# Patient Record
Sex: MALE | Race: WHITE | NOT HISPANIC OR LATINO | Employment: UNEMPLOYED | URBAN - METROPOLITAN AREA
[De-identification: names, ages, dates, MRNs, and addresses within clinical notes are randomized per-mention and may not be internally consistent; named-entity substitution may affect disease eponyms.]

---

## 2019-07-20 ENCOUNTER — OFFICE VISIT (OUTPATIENT)
Dept: URGENT CARE | Facility: CLINIC | Age: 69
End: 2019-07-20
Payer: MEDICARE

## 2019-07-20 VITALS
HEIGHT: 67 IN | RESPIRATION RATE: 18 BRPM | OXYGEN SATURATION: 95 % | SYSTOLIC BLOOD PRESSURE: 155 MMHG | BODY MASS INDEX: 29.82 KG/M2 | HEART RATE: 77 BPM | WEIGHT: 190 LBS | DIASTOLIC BLOOD PRESSURE: 89 MMHG | TEMPERATURE: 99 F

## 2019-07-20 DIAGNOSIS — B02.9 HERPES ZOSTER WITHOUT COMPLICATION: Primary | ICD-10-CM

## 2019-07-20 PROCEDURE — 99214 OFFICE O/P EST MOD 30 MIN: CPT | Mod: S$GLB,,, | Performed by: INTERNAL MEDICINE

## 2019-07-20 PROCEDURE — 99214 PR OFFICE/OUTPT VISIT, EST, LEVL IV, 30-39 MIN: ICD-10-PCS | Mod: S$GLB,,, | Performed by: INTERNAL MEDICINE

## 2019-07-20 RX ORDER — ZOLPIDEM TARTRATE 10 MG/1
TABLET ORAL
Refills: 1 | COMMUNITY
Start: 2019-07-16 | End: 2021-02-05 | Stop reason: SDUPTHER

## 2019-07-20 RX ORDER — CAPSAICIN 0.75 MG/G
CREAM TOPICAL 3 TIMES DAILY
Refills: 0 | COMMUNITY
Start: 2019-07-20 | End: 2021-02-05

## 2019-07-20 RX ORDER — BUPROPION HYDROCHLORIDE 150 MG/1
150 TABLET, EXTENDED RELEASE ORAL 2 TIMES DAILY
Refills: 3 | COMMUNITY
Start: 2019-07-16 | End: 2021-08-11 | Stop reason: SDUPTHER

## 2019-07-20 RX ORDER — LISINOPRIL 40 MG/1
40 TABLET ORAL DAILY
Refills: 3 | COMMUNITY
Start: 2019-07-16 | End: 2021-04-19 | Stop reason: SDUPTHER

## 2019-07-20 RX ORDER — DILTIAZEM HYDROCHLORIDE 240 MG/1
1 CAPSULE, EXTENDED RELEASE ORAL DAILY
Refills: 3 | COMMUNITY
Start: 2019-07-16 | End: 2021-04-19 | Stop reason: SDUPTHER

## 2019-07-20 RX ORDER — FAMCICLOVIR 500 MG/1
500 TABLET ORAL 3 TIMES DAILY
Qty: 21 TABLET | Refills: 0 | Status: SHIPPED | OUTPATIENT
Start: 2019-07-20 | End: 2019-07-27

## 2019-07-20 RX ORDER — METFORMIN HYDROCHLORIDE 500 MG/1
500 TABLET ORAL 3 TIMES DAILY
Refills: 1 | COMMUNITY
Start: 2019-05-22 | End: 2021-02-05 | Stop reason: SDUPTHER

## 2019-07-20 RX ORDER — HYDROCHLOROTHIAZIDE 25 MG/1
25 TABLET ORAL DAILY
Refills: 3 | COMMUNITY
Start: 2019-07-16 | End: 2021-04-19 | Stop reason: SDUPTHER

## 2019-07-20 RX ORDER — ATORVASTATIN CALCIUM 80 MG/1
80 TABLET, FILM COATED ORAL DAILY
Refills: 3 | COMMUNITY
Start: 2019-07-16 | End: 2021-02-05 | Stop reason: SDUPTHER

## 2019-07-20 NOTE — PATIENT INSTRUCTIONS

## 2019-07-20 NOTE — PROGRESS NOTES
"Subjective:       Patient ID: Miki Faustin is a 68 y.o. male.    Vitals:  height is 5' 7" (1.702 m) and weight is 86.2 kg (190 lb). His oral temperature is 98.7 °F (37.1 °C). His blood pressure is 155/89 (abnormal) and his pulse is 77. His respiration is 18 and oxygen saturation is 95%.     Chief Complaint: Rash    Pt presents today with rash (shingle) X's 3 days, pt states he did have his Shingles vaccine, pt also states he picked up a gate possibly pulled muscle in his chest,     Rash   This is a new problem. The current episode started in the past 7 days. The problem is unchanged. The affected locations include the abdomen and back. The rash is characterized by blistering and redness. He was exposed to nothing. Past treatments include topical steroids. The treatment provided no relief.       Skin: Positive for rash and erythema (Linear rash noted on the left chest.  It is not crossing midline. Erythematous base with some blisters noted).        Edema,erythema       Objective:      Physical Exam   Constitutional: He is oriented to person, place, and time. He appears well-developed and well-nourished. He is cooperative.  Non-toxic appearance. He does not appear ill. No distress.   HENT:   Head: Normocephalic and atraumatic.   Right Ear: Hearing, tympanic membrane, external ear and ear canal normal.   Left Ear: Hearing, tympanic membrane, external ear and ear canal normal.   Nose: Nose normal. No mucosal edema, rhinorrhea or nasal deformity. No epistaxis. Right sinus exhibits no maxillary sinus tenderness and no frontal sinus tenderness. Left sinus exhibits no maxillary sinus tenderness and no frontal sinus tenderness.   Mouth/Throat: Uvula is midline, oropharynx is clear and moist and mucous membranes are normal. No trismus in the jaw. Normal dentition. No uvula swelling. No posterior oropharyngeal erythema.   Eyes: Conjunctivae and lids are normal. Right eye exhibits no discharge. Left eye exhibits no discharge. " No scleral icterus.   Sclera clear bilat   Neck: Trachea normal, normal range of motion, full passive range of motion without pain and phonation normal. Neck supple.   Cardiovascular: Normal rate, regular rhythm, normal heart sounds, intact distal pulses and normal pulses.   Pulmonary/Chest: Effort normal and breath sounds normal. No respiratory distress.   Abdominal: Soft. Normal appearance and bowel sounds are normal. He exhibits no distension, no pulsatile midline mass and no mass. There is no tenderness.   Musculoskeletal: Normal range of motion. He exhibits no edema or deformity.   Neurological: He is alert and oriented to person, place, and time. He exhibits normal muscle tone. Coordination normal.   Skin: Skin is warm, dry and intact. He is not diaphoretic. There is erythema (Linear rash noted on the left chest.  It is not crossing midline. Erythematous base with some blisters noted). No pallor.        Psychiatric: He has a normal mood and affect. His speech is normal and behavior is normal. Judgment and thought content normal. Cognition and memory are normal.   Nursing note and vitals reviewed.      Assessment:       1. Herpes zoster without complication        Plan:         Herpes zoster without complication  -     famciclovir (FAMVIR) 500 MG tablet; Take 1 tablet (500 mg total) by mouth 3 (three) times daily. for 7 days  Dispense: 21 tablet; Refill: 0  -     capsicum 0.075% (ZOSTRIX) 0.075 % topical cream; Apply topically 3 (three) times daily.; Refill: 0      Patient Instructions       Shingles (Herpes Zoster)     Talk to your healthcare provider about the shingles vaccine.     Shingles is also called herpes zoster. It is a painful skin rash caused by the herpes zoster virus. This is the same virus that causes chickenpox. After a person has chickenpox, the virus remains inactive in the nerve cells. Years later, the virus can become active again and travel to the skin. Most people have shingles only once,  but it is possible to have it more than once.  What are the risk factors for shingles?  Anyone who has ever had chickenpox can develop shingles. But your risk is greater if you:  · Are 50 years of age or older  · Have an illness that weakens your immune system, such as HIV/AIDS  · Have cancer, especially Hodgkin disease or lymphoma  · Take medicines that weaken your immune system  What are the symptoms of shingles?  · The first sign of shingles is usually pain, burning, tingling, or itching on one part of your face or body. You may also feel as if you have the flu, with fever and chills.  · A red rash with small blisters appears within a few days. The rash may appear as follows:   ¨ The blisters can occur anywhere, but theyre most common on the back, chest, or abdomen.  ¨ They usually appear on only one side of the body, spreading along the nerve pathway where the virus was inactive.   ¨ The rash can also form around an eye, along one side of the face or neck, or in the mouth.  ¨ In a few people, usually those with weakened immune systems, shingles appear on more than one part of the body at once.  · After a few days, the blisters become dry and form a crust. The crust falls off in days to weeks. The blisters generally do not leave scars.  How is shingles treated?  For most people, shingles heals on its own in a few weeks. But treatment is recommended to help relieve pain, speed healing, and reduce the risk of complications. Antiviral medicines are prescribed within the first 72 hours of the appearance of the rash. To lessen symptoms:  · Apply ice packs (wrapped in a thin towel) or cool compresses, or soak in a cool bath.  · Use calamine lotion to calm itchy skin.  · Ask your healthcare provider about over-the-counter pain relievers. If your pain is severe, your healthcare provider may prescribe stronger pain medicines.  What are the complications of shingles?  Shingles often goes away with no lasting effects. But  some people have serious problems long after the blisters have healed:  · Postherpetic neuralgia. This is the most common complication. It is severe nerve pain at the place where the rash used to be. It can last for months, or even years after you have had shingles. Medicines can be prescribed to help relieve the pain and improve quality of life.  · Bacterial infection. Shingles blisters may become infected with bacteria. Antibiotic medicine is used to treat the infection.  · Eye problems. A person with shingles on the face should see his or her healthcare provider right away. Shingles can cause serious problems with vision, and even blindness.  Very rarely shingles can also lead to pneumonia, hearing problems, brain inflammation, or even death.   When to seek medical care  Contact your healthcare provider if you experience any of the following:  · Symptoms that dont go away with treatment  · A rash or blisters near your eye  · Increased drainage, fever, or rash after treatment, or severe pain that doesnt go away   How can shingles be prevented?  You can only get shingles if you have had chicken pox in the past. Those who have never had chickenpox can get the virus from you. Although instead of developing shingles, the person may get chickenpox. Until your blisters form scabs, avoid contact with others, especially the following:  · Pregnant women who have never had chickenpox or the vaccine  · Infants who were born early (prematurely) or who had low weight at birth  · People with weak immune system (for example, people receiving chemotherapy for cancer, people who have had organ transplants, or people with HIV infections)     The shingles vaccine  If youre 60 years of age or older, ask your healthcare provider if you should receive the shingles vaccine. The vaccine makes it less likely that you will develop shingles. If you do develop shingles, your symptoms will likely be milder than if you hadnt been  vaccinated. Note: Although the vaccine is licensed for people 50 years of age or older, the CDC does not recommend the vaccine for those who are 50 to 59 years old.   Date Last Reviewed: 10/1/2016  © 9514-9037 [employer]. [address] [place] [address], [place] [address]. All rights reserved. This information is not intended as a substitute for professional medical care. Always follow your healthcare professional's instructions.

## 2019-07-23 ENCOUNTER — TELEPHONE (OUTPATIENT)
Dept: URGENT CARE | Facility: CLINIC | Age: 69
End: 2019-07-23

## 2019-07-23 NOTE — TELEPHONE ENCOUNTER
Pt states that he is using the ointment and the pills and states that he is still uncomfortable but knows that it has to run its course.

## 2021-01-29 ENCOUNTER — TELEPHONE (OUTPATIENT)
Dept: FAMILY MEDICINE | Facility: CLINIC | Age: 71
End: 2021-01-29

## 2021-02-05 ENCOUNTER — OFFICE VISIT (OUTPATIENT)
Dept: FAMILY MEDICINE | Facility: CLINIC | Age: 71
End: 2021-02-05
Payer: MEDICARE

## 2021-02-05 VITALS
BODY MASS INDEX: 30.79 KG/M2 | DIASTOLIC BLOOD PRESSURE: 96 MMHG | WEIGHT: 191.56 LBS | SYSTOLIC BLOOD PRESSURE: 148 MMHG | HEART RATE: 88 BPM | HEIGHT: 66 IN

## 2021-02-05 DIAGNOSIS — R01.1 CARDIAC MURMUR: ICD-10-CM

## 2021-02-05 DIAGNOSIS — Z00.00 MEDICARE ANNUAL WELLNESS VISIT, SUBSEQUENT: ICD-10-CM

## 2021-02-05 DIAGNOSIS — G47.00 INSOMNIA, UNSPECIFIED TYPE: ICD-10-CM

## 2021-02-05 DIAGNOSIS — R79.89 ABNORMAL CBC: ICD-10-CM

## 2021-02-05 DIAGNOSIS — L73.2 HIDRADENITIS AXILLARIS: ICD-10-CM

## 2021-02-05 DIAGNOSIS — R73.02 GLUCOSE INTOLERANCE (IMPAIRED GLUCOSE TOLERANCE): ICD-10-CM

## 2021-02-05 DIAGNOSIS — I10 ESSENTIAL HYPERTENSION: ICD-10-CM

## 2021-02-05 DIAGNOSIS — E78.2 MIXED HYPERLIPIDEMIA: Primary | ICD-10-CM

## 2021-02-05 PROCEDURE — 99213 OFFICE O/P EST LOW 20 MIN: CPT | Mod: PBBFAC,PN | Performed by: INTERNAL MEDICINE

## 2021-02-05 PROCEDURE — 99999 PR PBB SHADOW E&M-EST. PATIENT-LVL III: CPT | Mod: PBBFAC,,, | Performed by: INTERNAL MEDICINE

## 2021-02-05 PROCEDURE — 99214 OFFICE O/P EST MOD 30 MIN: CPT | Mod: S$PBB,,, | Performed by: INTERNAL MEDICINE

## 2021-02-05 PROCEDURE — 99214 PR OFFICE/OUTPT VISIT, EST, LEVL IV, 30-39 MIN: ICD-10-PCS | Mod: S$PBB,,, | Performed by: INTERNAL MEDICINE

## 2021-02-05 PROCEDURE — 99999 PR PBB SHADOW E&M-EST. PATIENT-LVL III: ICD-10-PCS | Mod: PBBFAC,,, | Performed by: INTERNAL MEDICINE

## 2021-02-05 RX ORDER — PREDNISONE 10 MG/1
10 TABLET ORAL 2 TIMES DAILY
Qty: 10 TABLET | Refills: 0 | Status: SHIPPED | OUTPATIENT
Start: 2021-02-05 | End: 2021-02-10

## 2021-02-05 RX ORDER — ASPIRIN 81 MG/1
81 TABLET ORAL DAILY
COMMUNITY

## 2021-02-05 RX ORDER — METFORMIN HYDROCHLORIDE 500 MG/1
500 TABLET ORAL 3 TIMES DAILY
Qty: 270 TABLET | Refills: 2 | Status: SHIPPED | OUTPATIENT
Start: 2021-02-05 | End: 2022-01-10 | Stop reason: SDUPTHER

## 2021-02-05 RX ORDER — ZOLPIDEM TARTRATE 10 MG/1
10 TABLET ORAL NIGHTLY
Qty: 90 TABLET | Refills: 1 | Status: SHIPPED | OUTPATIENT
Start: 2021-02-05 | End: 2021-08-11 | Stop reason: SDUPTHER

## 2021-02-05 RX ORDER — ATORVASTATIN CALCIUM 80 MG/1
80 TABLET, FILM COATED ORAL DAILY
Qty: 90 TABLET | Refills: 2 | Status: SHIPPED | OUTPATIENT
Start: 2021-02-05 | End: 2022-02-15 | Stop reason: SDUPTHER

## 2021-02-09 ENCOUNTER — LAB VISIT (OUTPATIENT)
Dept: LAB | Facility: HOSPITAL | Age: 71
End: 2021-02-09
Attending: INTERNAL MEDICINE
Payer: MEDICARE

## 2021-02-09 DIAGNOSIS — E78.2 MIXED HYPERLIPIDEMIA: ICD-10-CM

## 2021-02-09 DIAGNOSIS — R79.89 ABNORMAL CBC: ICD-10-CM

## 2021-02-09 DIAGNOSIS — Z00.00 MEDICARE ANNUAL WELLNESS VISIT, SUBSEQUENT: ICD-10-CM

## 2021-02-09 DIAGNOSIS — R73.02 GLUCOSE INTOLERANCE (IMPAIRED GLUCOSE TOLERANCE): ICD-10-CM

## 2021-02-09 LAB
ALBUMIN SERPL BCP-MCNC: 4.4 G/DL (ref 3.5–5.2)
ALP SERPL-CCNC: 113 U/L (ref 55–135)
ALT SERPL W/O P-5'-P-CCNC: 25 U/L (ref 10–44)
ANION GAP SERPL CALC-SCNC: 8 MMOL/L (ref 8–16)
AST SERPL-CCNC: 22 U/L (ref 10–40)
BASOPHILS # BLD AUTO: 0.06 K/UL (ref 0–0.2)
BASOPHILS NFR BLD: 0.8 % (ref 0–1.9)
BILIRUB SERPL-MCNC: 0.5 MG/DL (ref 0.1–1)
BUN SERPL-MCNC: 23 MG/DL (ref 8–23)
CALCIUM SERPL-MCNC: 10 MG/DL (ref 8.7–10.5)
CHLORIDE SERPL-SCNC: 101 MMOL/L (ref 95–110)
CHOLEST SERPL-MCNC: 147 MG/DL (ref 120–199)
CHOLEST/HDLC SERPL: 3.8 {RATIO} (ref 2–5)
CO2 SERPL-SCNC: 29 MMOL/L (ref 23–29)
CREAT SERPL-MCNC: 1.1 MG/DL (ref 0.5–1.4)
DIFFERENTIAL METHOD: ABNORMAL
EOSINOPHIL # BLD AUTO: 0.1 K/UL (ref 0–0.5)
EOSINOPHIL NFR BLD: 1.6 % (ref 0–8)
ERYTHROCYTE [DISTWIDTH] IN BLOOD BY AUTOMATED COUNT: 13.1 % (ref 11.5–14.5)
EST. GFR  (AFRICAN AMERICAN): >60 ML/MIN/1.73 M^2
EST. GFR  (NON AFRICAN AMERICAN): >60 ML/MIN/1.73 M^2
ESTIMATED AVG GLUCOSE: 126 MG/DL (ref 68–131)
GLUCOSE SERPL-MCNC: 143 MG/DL (ref 70–110)
HBA1C MFR BLD: 6 % (ref 4–5.6)
HCT VFR BLD AUTO: 50.2 % (ref 40–54)
HDLC SERPL-MCNC: 39 MG/DL (ref 40–75)
HDLC SERPL: 26.5 % (ref 20–50)
HGB BLD-MCNC: 15.6 G/DL (ref 14–18)
IMM GRANULOCYTES # BLD AUTO: 0.02 K/UL (ref 0–0.04)
IMM GRANULOCYTES NFR BLD AUTO: 0.3 % (ref 0–0.5)
LDLC SERPL CALC-MCNC: 94 MG/DL (ref 63–159)
LYMPHOCYTES # BLD AUTO: 0.8 K/UL (ref 1–4.8)
LYMPHOCYTES NFR BLD: 9.5 % (ref 18–48)
MCH RBC QN AUTO: 28 PG (ref 27–31)
MCHC RBC AUTO-ENTMCNC: 31.1 G/DL (ref 32–36)
MCV RBC AUTO: 90 FL (ref 82–98)
MONOCYTES # BLD AUTO: 0.3 K/UL (ref 0.3–1)
MONOCYTES NFR BLD: 3.8 % (ref 4–15)
NEUTROPHILS # BLD AUTO: 6.7 K/UL (ref 1.8–7.7)
NEUTROPHILS NFR BLD: 84 % (ref 38–73)
NONHDLC SERPL-MCNC: 108 MG/DL
NRBC BLD-RTO: 0 /100 WBC
PLATELET # BLD AUTO: 352 K/UL (ref 150–350)
PMV BLD AUTO: 10 FL (ref 9.2–12.9)
POTASSIUM SERPL-SCNC: 4.7 MMOL/L (ref 3.5–5.1)
PROT SERPL-MCNC: 7.7 G/DL (ref 6–8.4)
RBC # BLD AUTO: 5.58 M/UL (ref 4.6–6.2)
SODIUM SERPL-SCNC: 138 MMOL/L (ref 136–145)
TRIGL SERPL-MCNC: 70 MG/DL (ref 30–150)
WBC # BLD AUTO: 7.96 K/UL (ref 3.9–12.7)

## 2021-02-09 PROCEDURE — 36415 COLL VENOUS BLD VENIPUNCTURE: CPT | Mod: PN

## 2021-02-09 PROCEDURE — 80061 LIPID PANEL: CPT

## 2021-02-09 PROCEDURE — 85025 COMPLETE CBC W/AUTO DIFF WBC: CPT

## 2021-02-09 PROCEDURE — 80053 COMPREHEN METABOLIC PANEL: CPT

## 2021-02-09 PROCEDURE — 83036 HEMOGLOBIN GLYCOSYLATED A1C: CPT

## 2021-02-10 DIAGNOSIS — Z12.11 COLON CANCER SCREENING: ICD-10-CM

## 2021-02-18 ENCOUNTER — CLINICAL SUPPORT (OUTPATIENT)
Dept: CARDIOLOGY | Facility: CLINIC | Age: 71
End: 2021-02-18
Attending: INTERNAL MEDICINE
Payer: MEDICARE

## 2021-02-18 VITALS — BODY MASS INDEX: 30.7 KG/M2 | WEIGHT: 191 LBS | HEIGHT: 66 IN

## 2021-02-18 DIAGNOSIS — R01.1 CARDIAC MURMUR: ICD-10-CM

## 2021-02-18 LAB
ASCENDING AORTA: 3.04 CM
AV INDEX (PROSTH): 0.87
AV MEAN GRADIENT: 6 MMHG
AV PEAK GRADIENT: 8 MMHG
AV VALVE AREA: 2.93 CM2
AV VELOCITY RATIO: 0.94
BSA FOR ECHO PROCEDURE: 2.01 M2
CV ECHO LV RWT: 0.59 CM
DOP CALC AO PEAK VEL: 1.44 M/S
DOP CALC AO VTI: 32.31 CM
DOP CALC LVOT AREA: 3.4 CM2
DOP CALC LVOT DIAMETER: 2.07 CM
DOP CALC LVOT PEAK VEL: 1.36 M/S
DOP CALC LVOT STROKE VOLUME: 94.82 CM3
DOP CALCLVOT PEAK VEL VTI: 28.19 CM
E WAVE DECELERATION TIME: 185.17 MSEC
E/A RATIO: 0.85
E/E' RATIO: 12.38 M/S
ECHO LV POSTERIOR WALL: 1.26 CM (ref 0.6–1.1)
FRACTIONAL SHORTENING: 28 % (ref 28–44)
INTERVENTRICULAR SEPTUM: 1.31 CM (ref 0.6–1.1)
LA MAJOR: 4.02 CM
LA MINOR: 4.66 CM
LA WIDTH: 3.36 CM
LEFT ATRIUM SIZE: 3.59 CM
LEFT ATRIUM VOLUME INDEX: 22.6 ML/M2
LEFT ATRIUM VOLUME: 44.26 CM3
LEFT INTERNAL DIMENSION IN SYSTOLE: 3.04 CM (ref 2.1–4)
LEFT VENTRICLE DIASTOLIC VOLUME INDEX: 40.9 ML/M2
LEFT VENTRICLE DIASTOLIC VOLUME: 80.16 ML
LEFT VENTRICLE MASS INDEX: 102 G/M2
LEFT VENTRICLE SYSTOLIC VOLUME INDEX: 18.5 ML/M2
LEFT VENTRICLE SYSTOLIC VOLUME: 36.26 ML
LEFT VENTRICULAR INTERNAL DIMENSION IN DIASTOLE: 4.24 CM (ref 3.5–6)
LEFT VENTRICULAR MASS: 199.94 G
LV LATERAL E/E' RATIO: 12.38 M/S
LV SEPTAL E/E' RATIO: 12.38 M/S
MV PEAK A VEL: 1.16 M/S
MV PEAK E VEL: 0.99 M/S
MV STENOSIS PRESSURE HALF TIME: 53.7 MS
MV VALVE AREA P 1/2 METHOD: 4.1 CM2
PISA TR MAX VEL: 2.35 M/S
RA MAJOR: 3.85 CM
RA PRESSURE: 3 MMHG
RA WIDTH: 2.4 CM
RIGHT VENTRICULAR END-DIASTOLIC DIMENSION: 3.55 CM
RV TISSUE DOPPLER FREE WALL SYSTOLIC VELOCITY 1 (APICAL 4 CHAMBER VIEW): 15.57 CM/S
SINUS: 3.42 CM
STJ: 2.78 CM
TDI LATERAL: 0.08 M/S
TDI SEPTAL: 0.08 M/S
TDI: 0.08 M/S
TR MAX PG: 22 MMHG
TRICUSPID ANNULAR PLANE SYSTOLIC EXCURSION: 3.02 CM
TV REST PULMONARY ARTERY PRESSURE: 25 MMHG

## 2021-02-18 PROCEDURE — 93306 TTE W/DOPPLER COMPLETE: CPT | Mod: PBBFAC,PO | Performed by: INTERNAL MEDICINE

## 2021-02-18 PROCEDURE — 99999 PR PBB SHADOW E&M-EST. PATIENT-LVL I: CPT | Mod: PBBFAC,,,

## 2021-02-18 PROCEDURE — 99211 OFF/OP EST MAY X REQ PHY/QHP: CPT | Mod: PBBFAC,PO

## 2021-02-18 PROCEDURE — 99999 PR PBB SHADOW E&M-EST. PATIENT-LVL I: ICD-10-PCS | Mod: PBBFAC,,,

## 2021-02-18 PROCEDURE — 93306 ECHO (CUPID ONLY): ICD-10-PCS | Mod: 26,S$PBB,, | Performed by: INTERNAL MEDICINE

## 2021-03-01 ENCOUNTER — TELEPHONE (OUTPATIENT)
Dept: FAMILY MEDICINE | Facility: CLINIC | Age: 71
End: 2021-03-01

## 2021-04-19 ENCOUNTER — TELEPHONE (OUTPATIENT)
Dept: FAMILY MEDICINE | Facility: CLINIC | Age: 71
End: 2021-04-19

## 2021-04-19 ENCOUNTER — PATIENT MESSAGE (OUTPATIENT)
Dept: FAMILY MEDICINE | Facility: CLINIC | Age: 71
End: 2021-04-19

## 2021-04-19 RX ORDER — DILTIAZEM HYDROCHLORIDE 240 MG/1
240 CAPSULE, EXTENDED RELEASE ORAL DAILY
Qty: 90 CAPSULE | Refills: 2 | Status: SHIPPED | OUTPATIENT
Start: 2021-04-19 | End: 2022-02-15 | Stop reason: SDUPTHER

## 2021-04-19 RX ORDER — LISINOPRIL 40 MG/1
40 TABLET ORAL DAILY
Qty: 90 TABLET | Refills: 2 | Status: SHIPPED | OUTPATIENT
Start: 2021-04-19 | End: 2022-02-15 | Stop reason: SDUPTHER

## 2021-04-19 RX ORDER — HYDROCHLOROTHIAZIDE 25 MG/1
25 TABLET ORAL DAILY
Qty: 90 TABLET | Refills: 2 | Status: SHIPPED | OUTPATIENT
Start: 2021-04-19 | End: 2022-02-15 | Stop reason: SDUPTHER

## 2021-08-10 ENCOUNTER — PATIENT MESSAGE (OUTPATIENT)
Dept: FAMILY MEDICINE | Facility: CLINIC | Age: 71
End: 2021-08-10

## 2021-08-11 ENCOUNTER — OFFICE VISIT (OUTPATIENT)
Dept: FAMILY MEDICINE | Facility: CLINIC | Age: 71
End: 2021-08-11
Payer: MEDICARE

## 2021-08-11 VITALS
HEIGHT: 66 IN | BODY MASS INDEX: 31.6 KG/M2 | WEIGHT: 196.63 LBS | OXYGEN SATURATION: 97 % | DIASTOLIC BLOOD PRESSURE: 88 MMHG | RESPIRATION RATE: 16 BRPM | HEART RATE: 78 BPM | SYSTOLIC BLOOD PRESSURE: 130 MMHG | TEMPERATURE: 99 F

## 2021-08-11 DIAGNOSIS — Z12.5 SCREENING PSA (PROSTATE SPECIFIC ANTIGEN): ICD-10-CM

## 2021-08-11 DIAGNOSIS — G47.00 INSOMNIA, UNSPECIFIED TYPE: ICD-10-CM

## 2021-08-11 DIAGNOSIS — R79.89 ABNORMAL CBC: ICD-10-CM

## 2021-08-11 DIAGNOSIS — Z00.00 MEDICARE ANNUAL WELLNESS VISIT, SUBSEQUENT: ICD-10-CM

## 2021-08-11 DIAGNOSIS — J30.89 ENVIRONMENTAL AND SEASONAL ALLERGIES: ICD-10-CM

## 2021-08-11 DIAGNOSIS — E78.2 MIXED HYPERLIPIDEMIA: ICD-10-CM

## 2021-08-11 DIAGNOSIS — R05.9 COUGH: Primary | ICD-10-CM

## 2021-08-11 DIAGNOSIS — I10 ESSENTIAL HYPERTENSION: ICD-10-CM

## 2021-08-11 DIAGNOSIS — E11.9 TYPE 2 DIABETES MELLITUS WITHOUT COMPLICATION, WITHOUT LONG-TERM CURRENT USE OF INSULIN: ICD-10-CM

## 2021-08-11 DIAGNOSIS — J84.9 INTERSTITIAL LUNG DISORDERS: ICD-10-CM

## 2021-08-11 PROCEDURE — 99214 OFFICE O/P EST MOD 30 MIN: CPT | Mod: PBBFAC,PN | Performed by: INTERNAL MEDICINE

## 2021-08-11 PROCEDURE — 99214 PR OFFICE/OUTPT VISIT, EST, LEVL IV, 30-39 MIN: ICD-10-PCS | Mod: S$PBB,,, | Performed by: INTERNAL MEDICINE

## 2021-08-11 PROCEDURE — 99999 PR PBB SHADOW E&M-EST. PATIENT-LVL IV: CPT | Mod: PBBFAC,,, | Performed by: INTERNAL MEDICINE

## 2021-08-11 PROCEDURE — 99999 PR PBB SHADOW E&M-EST. PATIENT-LVL IV: ICD-10-PCS | Mod: PBBFAC,,, | Performed by: INTERNAL MEDICINE

## 2021-08-11 PROCEDURE — 99214 OFFICE O/P EST MOD 30 MIN: CPT | Mod: S$PBB,,, | Performed by: INTERNAL MEDICINE

## 2021-08-11 RX ORDER — LEVOCETIRIZINE DIHYDROCHLORIDE 5 MG/1
5 TABLET, FILM COATED ORAL NIGHTLY
COMMUNITY

## 2021-08-11 RX ORDER — MONTELUKAST SODIUM 10 MG/1
10 TABLET ORAL NIGHTLY
Qty: 90 TABLET | Refills: 3 | Status: SHIPPED | OUTPATIENT
Start: 2021-08-11 | End: 2021-09-10

## 2021-08-11 RX ORDER — BUPROPION HYDROCHLORIDE 150 MG/1
150 TABLET, EXTENDED RELEASE ORAL 2 TIMES DAILY
Qty: 180 TABLET | Refills: 3 | Status: SHIPPED | OUTPATIENT
Start: 2021-08-11 | End: 2022-10-14 | Stop reason: SDUPTHER

## 2021-08-11 RX ORDER — ZOLPIDEM TARTRATE 5 MG/1
5 TABLET ORAL NIGHTLY
Qty: 90 TABLET | Refills: 1 | Status: SHIPPED | OUTPATIENT
Start: 2021-08-11 | End: 2022-12-07 | Stop reason: SDUPTHER

## 2021-08-23 ENCOUNTER — HOSPITAL ENCOUNTER (OUTPATIENT)
Dept: RADIOLOGY | Facility: HOSPITAL | Age: 71
Discharge: HOME | End: 2021-08-23
Attending: INTERNAL MEDICINE
Payer: MEDICARE

## 2021-08-23 DIAGNOSIS — R05.9 COUGH: ICD-10-CM

## 2021-08-23 PROCEDURE — 71046 X-RAY EXAM CHEST 2 VIEWS: CPT | Mod: TC,PN

## 2021-08-23 PROCEDURE — 71046 XR CHEST PA AND LATERAL: ICD-10-PCS | Mod: 26,,, | Performed by: RADIOLOGY

## 2021-08-23 PROCEDURE — 71046 X-RAY EXAM CHEST 2 VIEWS: CPT | Mod: 26,,, | Performed by: RADIOLOGY

## 2021-09-17 ENCOUNTER — HOSPITAL ENCOUNTER (OUTPATIENT)
Dept: RADIOLOGY | Facility: HOSPITAL | Age: 71
Discharge: HOME | End: 2021-09-17
Attending: INTERNAL MEDICINE
Payer: MEDICARE

## 2021-09-17 DIAGNOSIS — R05.9 COUGH: ICD-10-CM

## 2021-09-17 DIAGNOSIS — J84.9 INTERSTITIAL LUNG DISORDERS: ICD-10-CM

## 2021-09-17 PROCEDURE — 71250 CT CHEST WITHOUT CONTRAST: ICD-10-PCS | Mod: 26,,, | Performed by: RADIOLOGY

## 2021-09-17 PROCEDURE — 71250 CT THORAX DX C-: CPT | Mod: TC,PO

## 2021-09-17 PROCEDURE — 71250 CT THORAX DX C-: CPT | Mod: 26,,, | Performed by: RADIOLOGY

## 2021-09-22 DIAGNOSIS — I35.8 AORTIC VALVE SCLEROSIS: ICD-10-CM

## 2021-09-22 DIAGNOSIS — I25.10 CORONARY ARTERY DISEASE INVOLVING NATIVE CORONARY ARTERY OF NATIVE HEART WITHOUT ANGINA PECTORIS: ICD-10-CM

## 2021-09-22 DIAGNOSIS — J47.9 BRONCHIECTASIS WITHOUT COMPLICATION: Primary | ICD-10-CM

## 2021-09-22 DIAGNOSIS — R91.8 MULTIPLE PULMONARY NODULES: ICD-10-CM

## 2021-09-22 DIAGNOSIS — R01.1 CARDIAC MURMUR: ICD-10-CM

## 2021-10-02 ENCOUNTER — PATIENT MESSAGE (OUTPATIENT)
Dept: FAMILY MEDICINE | Facility: CLINIC | Age: 71
End: 2021-10-02

## 2021-10-04 RX ORDER — TRIAMCINOLONE ACETONIDE 1 MG/G
CREAM TOPICAL 2 TIMES DAILY
Qty: 45 G | Refills: 0 | Status: SHIPPED | OUTPATIENT
Start: 2021-10-04 | End: 2023-05-09

## 2021-11-12 ENCOUNTER — OFFICE VISIT (OUTPATIENT)
Dept: CARDIOLOGY | Facility: CLINIC | Age: 71
End: 2021-11-12
Payer: MEDICARE

## 2021-11-12 VITALS
SYSTOLIC BLOOD PRESSURE: 159 MMHG | HEART RATE: 90 BPM | WEIGHT: 192.44 LBS | HEIGHT: 66 IN | DIASTOLIC BLOOD PRESSURE: 92 MMHG | BODY MASS INDEX: 30.93 KG/M2

## 2021-11-12 DIAGNOSIS — R01.1 CARDIAC MURMUR: ICD-10-CM

## 2021-11-12 DIAGNOSIS — I35.8 AORTIC VALVE SCLEROSIS: ICD-10-CM

## 2021-11-12 DIAGNOSIS — I25.10 CORONARY ARTERY DISEASE INVOLVING NATIVE CORONARY ARTERY OF NATIVE HEART WITHOUT ANGINA PECTORIS: ICD-10-CM

## 2021-11-12 DIAGNOSIS — I10 ESSENTIAL HYPERTENSION: Primary | ICD-10-CM

## 2021-11-12 PROCEDURE — 99999 PR PBB SHADOW E&M-EST. PATIENT-LVL IV: ICD-10-PCS | Mod: PBBFAC,,, | Performed by: INTERNAL MEDICINE

## 2021-11-12 PROCEDURE — 99214 OFFICE O/P EST MOD 30 MIN: CPT | Mod: PBBFAC,PO | Performed by: INTERNAL MEDICINE

## 2021-11-12 PROCEDURE — 99999 PR PBB SHADOW E&M-EST. PATIENT-LVL IV: CPT | Mod: PBBFAC,,, | Performed by: INTERNAL MEDICINE

## 2021-11-12 PROCEDURE — 99204 OFFICE O/P NEW MOD 45 MIN: CPT | Mod: S$PBB,,, | Performed by: INTERNAL MEDICINE

## 2021-11-12 PROCEDURE — 99204 PR OFFICE/OUTPT VISIT, NEW, LEVL IV, 45-59 MIN: ICD-10-PCS | Mod: S$PBB,,, | Performed by: INTERNAL MEDICINE

## 2022-01-10 ENCOUNTER — PATIENT MESSAGE (OUTPATIENT)
Dept: FAMILY MEDICINE | Facility: CLINIC | Age: 72
End: 2022-01-10
Payer: MEDICARE

## 2022-01-10 DIAGNOSIS — R73.02 GLUCOSE INTOLERANCE (IMPAIRED GLUCOSE TOLERANCE): ICD-10-CM

## 2022-01-10 NOTE — TELEPHONE ENCOUNTER
Care Due:                  Date            Visit Type   Department     Provider  --------------------------------------------------------------------------------                                             [facility]  Last Visit: 08-      [facility]       Junior Maddox                                           [facility]  Next Visit: 02-      [facility]       Junior Maddox                                                            Last  Test          Frequency    Reason                     Performed    Due Date  --------------------------------------------------------------------------------    HBA1C.......  6 months...  metFORMIN................  08- 02-    Powered by [employer]. Reference number: [id number].   1/10/2022 2:21:19 PM CST

## 2022-01-13 ENCOUNTER — PATIENT MESSAGE (OUTPATIENT)
Dept: FAMILY MEDICINE | Facility: CLINIC | Age: 72
End: 2022-01-13
Payer: MEDICARE

## 2022-01-13 RX ORDER — METFORMIN HYDROCHLORIDE 500 MG/1
500 TABLET ORAL 3 TIMES DAILY
Qty: 270 TABLET | Refills: 0 | Status: SHIPPED | OUTPATIENT
Start: 2022-01-13 | End: 2022-02-15 | Stop reason: SDUPTHER

## 2022-01-14 NOTE — TELEPHONE ENCOUNTER
Refill Authorization Note   Miki Faustin  is requesting a refill authorization.  Brief Assessment and Rationale for Refill:  Approve     Medication Therapy Plan:       Medication Reconciliation Completed: No   Comments:   --->Care Gap information included below if applicable.   Orders Placed This Encounter    metFORMIN (GLUCOPHAGE) 500 MG tablet      Requested Prescriptions   Signed Prescriptions Disp Refills    metFORMIN (GLUCOPHAGE) 500 MG tablet 270 tablet 0     Sig: Take 1 tablet (500 mg total) by mouth 3 (three) times daily.       Endocrinology:  Diabetes - Biguanides Passed - 1/10/2022  2:20 PM        Passed - Patient is at least 18 years old        Passed - Valid encounter within last 15 months     Recent Visits  Date Type Provider Dept   08/11/21 Office Visit Junior Maddox MD [facility]   02/05/21 Office Visit Junior Maddox MD [facility]   Showing recent visits within past 720 days and meeting all other requirements  Future Appointments  No visits were found meeting these conditions.  Showing future appointments within next 150 days and meeting all other requirements      Future Appointments              In 1 month Junior Maddox MD [facility]    In 1 month RESPIRATORY THERAPY, [facility] PULMONARY FUNCTION [facility]    In 1 month Sherice Cerna MD [facility]                Passed - Cr is 1.39 or below and within 360 days     Lab Results   Component Value Date    CREATININE 1.1 08/23/2021    CREATININE 1.1 02/09/2021              Passed - HBA1C within 180 days     Lab Results   Component Value Date    HGBA1C 6.0 (H) 08/23/2021    HGBA1C 6.0 (H) 02/09/2021              Passed - eGFR is 45 or above and within 360 days     Lab Results   Component Value Date    EGFRNONAA >60.0 08/23/2021    EGFRNONAA >60.0  02/09/2021                    Appointments  past 12m or future 3m with PCP    Date Provider   Last Visit   8/11/2021 Junior Maddox MD   Next Visit   2/15/2022 Junior Maddox MD   ED visits in past 90 days: 0     Note composed:8:12 PM 01/13/2022

## 2022-02-06 ENCOUNTER — PATIENT MESSAGE (OUTPATIENT)
Dept: FAMILY MEDICINE | Facility: CLINIC | Age: 72
End: 2022-02-06
Payer: MEDICARE

## 2022-02-15 ENCOUNTER — OFFICE VISIT (OUTPATIENT)
Dept: FAMILY MEDICINE | Facility: CLINIC | Age: 72
End: 2022-02-15
Payer: MEDICARE

## 2022-02-15 VITALS
SYSTOLIC BLOOD PRESSURE: 136 MMHG | HEIGHT: 66 IN | DIASTOLIC BLOOD PRESSURE: 72 MMHG | BODY MASS INDEX: 29.8 KG/M2 | WEIGHT: 185.44 LBS

## 2022-02-15 DIAGNOSIS — I05.8 MITRAL VALVE SCLEROSIS: ICD-10-CM

## 2022-02-15 DIAGNOSIS — E11.9 TYPE 2 DIABETES MELLITUS WITHOUT COMPLICATION, WITHOUT LONG-TERM CURRENT USE OF INSULIN: ICD-10-CM

## 2022-02-15 DIAGNOSIS — I25.10 CORONARY ARTERY DISEASE INVOLVING NATIVE CORONARY ARTERY OF NATIVE HEART WITHOUT ANGINA PECTORIS: ICD-10-CM

## 2022-02-15 DIAGNOSIS — R73.02 GLUCOSE INTOLERANCE (IMPAIRED GLUCOSE TOLERANCE): ICD-10-CM

## 2022-02-15 DIAGNOSIS — Z00.00 MEDICARE ANNUAL WELLNESS VISIT, SUBSEQUENT: Primary | ICD-10-CM

## 2022-02-15 DIAGNOSIS — I35.8 AORTIC VALVE SCLEROSIS: ICD-10-CM

## 2022-02-15 DIAGNOSIS — I10 ESSENTIAL HYPERTENSION: ICD-10-CM

## 2022-02-15 DIAGNOSIS — E78.2 MIXED HYPERLIPIDEMIA: ICD-10-CM

## 2022-02-15 DIAGNOSIS — Z12.5 ENCOUNTER FOR SCREENING FOR MALIGNANT NEOPLASM OF PROSTATE: ICD-10-CM

## 2022-02-15 DIAGNOSIS — J47.9 BRONCHIECTASIS WITHOUT COMPLICATION: ICD-10-CM

## 2022-02-15 DIAGNOSIS — R76.8 POSITIVE ANA (ANTINUCLEAR ANTIBODY): ICD-10-CM

## 2022-02-15 DIAGNOSIS — I70.90 ATHEROSCLEROSIS: ICD-10-CM

## 2022-02-15 PROCEDURE — G0439 PPPS, SUBSEQ VISIT: HCPCS | Mod: S$PBB,,, | Performed by: INTERNAL MEDICINE

## 2022-02-15 PROCEDURE — G0439 PR MEDICARE ANNUAL WELLNESS SUBSEQUENT VISIT: ICD-10-PCS | Mod: S$PBB,,, | Performed by: INTERNAL MEDICINE

## 2022-02-15 PROCEDURE — 99999 PR PBB SHADOW E&M-EST. PATIENT-LVL III: ICD-10-PCS | Mod: PBBFAC,,, | Performed by: INTERNAL MEDICINE

## 2022-02-15 PROCEDURE — 99999 PR PBB SHADOW E&M-EST. PATIENT-LVL III: CPT | Mod: PBBFAC,,, | Performed by: INTERNAL MEDICINE

## 2022-02-15 PROCEDURE — 99213 OFFICE O/P EST LOW 20 MIN: CPT | Mod: PBBFAC,PN | Performed by: INTERNAL MEDICINE

## 2022-02-15 RX ORDER — METFORMIN HYDROCHLORIDE 500 MG/1
500 TABLET ORAL 3 TIMES DAILY
Qty: 270 TABLET | Refills: 2 | Status: SHIPPED | OUTPATIENT
Start: 2022-02-15 | End: 2022-10-31 | Stop reason: SDUPTHER

## 2022-02-15 RX ORDER — HYDROCHLOROTHIAZIDE 25 MG/1
25 TABLET ORAL DAILY
Qty: 90 TABLET | Refills: 2 | Status: SHIPPED | OUTPATIENT
Start: 2022-02-15 | End: 2022-10-31 | Stop reason: SDUPTHER

## 2022-02-15 RX ORDER — LISINOPRIL 40 MG/1
40 TABLET ORAL DAILY
Qty: 90 TABLET | Refills: 2 | Status: SHIPPED | OUTPATIENT
Start: 2022-02-15 | End: 2022-10-31 | Stop reason: SDUPTHER

## 2022-02-15 RX ORDER — DILTIAZEM HYDROCHLORIDE 240 MG/1
240 CAPSULE, EXTENDED RELEASE ORAL DAILY
Qty: 90 CAPSULE | Refills: 2 | Status: SHIPPED | OUTPATIENT
Start: 2022-02-15 | End: 2022-10-31 | Stop reason: SDUPTHER

## 2022-02-15 RX ORDER — ATORVASTATIN CALCIUM 80 MG/1
80 TABLET, FILM COATED ORAL DAILY
Qty: 90 TABLET | Refills: 2 | Status: SHIPPED | OUTPATIENT
Start: 2022-02-15 | End: 2022-10-31 | Stop reason: SDUPTHER

## 2022-02-15 NOTE — PROGRESS NOTES
HRA: patient feels overall is healthy.  Psychosocial and behavioral risks discussed.  BMI - 29   Weight loss discussed.   Diet - well balanced.   ADL: self sufficient in all  Instrumental ADL: patient is able to manage things like their medications and finances.    Memory or cognitive function - Patient has no issues with either   Ambulates normal. No recent falls.  Exercise - yard work some limitations from OA/DDD  Depression screening is negative.  Hearing--no deficits.  Vision - no deficits. /glasses   Incontinence - none    Preventative health needs discussed and patient was given a printed list of what they have received and what they will need with in the next 5-10 years.    Advanced Care directive: yes has in place needs to update    I have reviewed and updated the patient's current list of providers.       In addition to the patient's preventative review and discussion today, the patient also has other issues to discuss today with a separate summary of plan below:       Subjective:       Patient ID: Miki Faustin is a 71 y.o. male.  Need to get old notes     PSA: 1.2 (( 2019   Colonoscopy:  6/2/14 r/c 5   Immunizations: Flu: yes Tdap: 4/6/17  Pneumovax: 9/29/18  Prevnar 13: 7/2017  Shingles: 2019  Covid: yes   Smoker: never   Eye: Dr Gifford     Chief Complaint: Follow-up    Hypertension  Pertinent negatives include no chest pain.   Hyperlipidemia  Pertinent negatives include no chest pain.   Follow-up  Pertinent negatives include no chest pain, chills or joint swelling.       Last labs mild dehydration elevated calcium.  Recheck labs.    HTN: controlled;  Rx diltiazem 240, HCTZ 25, lisinopril 40.   Cardi mumur: + Mod AV sclerosis, Mild MR sclerosis//  mild TR, 2021 ;; denies sob or cp //cardio  Anxiety: controlled Rx Wellbutrin  twice daily  Type 2 DM:   glucose //108 a1c 6.0 // Rx regular metformin 503 pills daily.  Not checking sugars at home.  /highest fasting 143  Insomnia: controlled; ambien  "10 mg - cut down to 5 mg due to age   HLD:  LDL goal< 70//due for r.c //  Rx lipitor 80 mg takes 1/2 pill //  Atherosclerosis: + Aorta scattered calcific plaque, coronary artery calcifications.  Pulmonary nodules/mild  Bronchiectasis BLL:  Refer to pulmonary.  Schedule for PFT.  Positive pulmonary nodules < 4 mm bilateral. See CT 2021. Allergies: mgmt Dr Pacheco  Rx Xyzal - getting 3 allergies shots weekly     Review of Systems:  Review of Systems   Constitutional: Negative for chills.   HENT: Negative for drooling.    Eyes: Negative for pain.   Respiratory: Negative for choking.    Cardiovascular: Negative for chest pain.   Gastrointestinal: Negative for blood in stool.   Genitourinary: Negative for hematuria.   Musculoskeletal: Negative for joint swelling.   Skin: Negative for pallor.   Neurological: Negative for facial asymmetry.   Psychiatric/Behavioral: Negative for confusion.       Objective:     Vitals:    02/15/22 1052   BP: 136/72   BP Location: Left arm   Weight: 84.1 kg (185 lb 6.5 oz)   Height: 5' 6" (1.676 m)          Physical Exam  Constitutional:       Appearance: Normal appearance.   HENT:      Head: Normocephalic and atraumatic.      Mouth/Throat:      Pharynx: Oropharynx is clear.   Eyes:      Extraocular Movements: Extraocular movements intact.      Conjunctiva/sclera: Conjunctivae normal.      Pupils: Pupils are equal, round, and reactive to light.   Cardiovascular:      Rate and Rhythm: Normal rate and regular rhythm.      Heart sounds: Murmur heard.        Comments: 3/6 KAITY   Pulmonary:      Effort: Pulmonary effort is normal.      Breath sounds: Normal breath sounds.   Abdominal:      General: Bowel sounds are normal.      Palpations: Abdomen is soft.   Musculoskeletal:         General: Normal range of motion.      Cervical back: Normal range of motion and neck supple.   Skin:     General: Skin is warm and dry.   Neurological:      General: No focal deficit present.      Mental Status: " He is alert and oriented to person, place, and time.   Psychiatric:         Mood and Affect: Mood normal.           Assessment & Plan:  1. Medicare annual wellness visit, subsequent  - Comprehensive Metabolic Panel; Future  - Hemoglobin A1C; Future  - Lipid Panel; Future  - PSA, Screening; Future  - Microalbumin/Creatinine Ratio, Urine; Future  - Urinalysis; Future    2. Type 2 diabetes mellitus without complication, without long-term current use of insulin  - Comprehensive Metabolic Panel; Future  - Hemoglobin A1C; Future  - Microalbumin/Creatinine Ratio, Urine; Future    3. Mixed hyperlipidemia  - Lipid Panel; Future  - atorvastatin (LIPITOR) 80 MG tablet; Take 1 tablet (80 mg total) by mouth once daily.  Dispense: 90 tablet; Refill: 2    4. Essential hypertension  - Urinalysis; Future    5. Bronchiectasis without complication    6. Coronary artery disease involving native coronary artery of native heart without angina pectoris    7. Glucose intolerance (impaired glucose tolerance)  - metFORMIN (GLUCOPHAGE) 500 MG tablet; Take 1 tablet (500 mg total) by mouth 3 (three) times daily.  Dispense: 270 tablet; Refill: 2    8. Encounter for screening for malignant neoplasm of prostate   - PSA, Screening; Future    9. Atherosclerosis    10. Positive JENA (antinuclear antibody)    11. Aortic valve sclerosis    12. Mitral valve sclerosis

## 2022-02-17 ENCOUNTER — TELEPHONE (OUTPATIENT)
Dept: HEMATOLOGY/ONCOLOGY | Facility: CLINIC | Age: 72
End: 2022-02-17
Payer: MEDICARE

## 2022-02-17 NOTE — TELEPHONE ENCOUNTER
Per Dr Gutierrez, called the pt to schedule appt with her.  [place] him [place] tomorrow on Fri 2/18.        02/18/22  Called and spoke to pt.  Scheduled pt with Dr Gutierrez for 3/7/22.  Confirmed date, time and location of appt with pt.

## 2022-03-07 ENCOUNTER — OFFICE VISIT (OUTPATIENT)
Dept: HEMATOLOGY/ONCOLOGY | Facility: CLINIC | Age: 72
End: 2022-03-07
Payer: MEDICARE

## 2022-03-07 ENCOUNTER — PATIENT OUTREACH (OUTPATIENT)
Dept: ADMINISTRATIVE | Facility: HOSPITAL | Age: 72
End: 2022-03-07
Payer: MEDICARE

## 2022-03-07 ENCOUNTER — PATIENT MESSAGE (OUTPATIENT)
Dept: ADMINISTRATIVE | Facility: HOSPITAL | Age: 72
End: 2022-03-07
Payer: MEDICARE

## 2022-03-07 VITALS
TEMPERATURE: 98 F | HEART RATE: 90 BPM | BODY MASS INDEX: 27.53 KG/M2 | WEIGHT: 185.88 LBS | RESPIRATION RATE: 18 BRPM | DIASTOLIC BLOOD PRESSURE: 88 MMHG | SYSTOLIC BLOOD PRESSURE: 158 MMHG | HEIGHT: 69 IN | OXYGEN SATURATION: 96 %

## 2022-03-07 DIAGNOSIS — J38.7 PRESBYLARYNGES: ICD-10-CM

## 2022-03-07 DIAGNOSIS — R49.0 DYSPHONIA: ICD-10-CM

## 2022-03-07 DIAGNOSIS — R09.82 PND (POST-NASAL DRIP): ICD-10-CM

## 2022-03-07 DIAGNOSIS — J32.8 OTHER CHRONIC SINUSITIS: ICD-10-CM

## 2022-03-07 DIAGNOSIS — K21.9 LARYNGOPHARYNGEAL REFLUX (LPR): Primary | ICD-10-CM

## 2022-03-07 PROCEDURE — 31575 DIAGNOSTIC LARYNGOSCOPY: CPT | Mod: S$PBB,,, | Performed by: OTOLARYNGOLOGY

## 2022-03-07 PROCEDURE — 31575 PR LARYNGOSCOPY, FLEXIBLE; DIAGNOSTIC: ICD-10-PCS | Mod: S$PBB,,, | Performed by: OTOLARYNGOLOGY

## 2022-03-07 PROCEDURE — 99204 PR OFFICE/OUTPT VISIT, NEW, LEVL IV, 45-59 MIN: ICD-10-PCS | Mod: 25,S$PBB,, | Performed by: OTOLARYNGOLOGY

## 2022-03-07 PROCEDURE — 99204 OFFICE O/P NEW MOD 45 MIN: CPT | Mod: 25,S$PBB,, | Performed by: OTOLARYNGOLOGY

## 2022-03-07 PROCEDURE — 99999 PR PBB SHADOW E&M-EST. PATIENT-LVL V: ICD-10-PCS | Mod: PBBFAC,,, | Performed by: OTOLARYNGOLOGY

## 2022-03-07 PROCEDURE — 99215 OFFICE O/P EST HI 40 MIN: CPT | Mod: PBBFAC,PN | Performed by: OTOLARYNGOLOGY

## 2022-03-07 PROCEDURE — 99999 PR PBB SHADOW E&M-EST. PATIENT-LVL V: CPT | Mod: PBBFAC,,, | Performed by: OTOLARYNGOLOGY

## 2022-03-07 PROCEDURE — 31575 DIAGNOSTIC LARYNGOSCOPY: CPT | Mod: PBBFAC,PN | Performed by: OTOLARYNGOLOGY

## 2022-03-07 RX ORDER — ESOMEPRAZOLE MAGNESIUM 40 MG/1
40 CAPSULE, DELAYED RELEASE ORAL
Qty: 30 CAPSULE | Refills: 3 | Status: SHIPPED | OUTPATIENT
Start: 2022-03-07 | End: 2022-11-01 | Stop reason: SDUPTHER

## 2022-03-07 NOTE — PATIENT INSTRUCTIONS
Saline nasal irrigations twice a day  Neti Pot or Marilyn Med--buy over the counter  Nexium 40 mg once a day before meals

## 2022-03-07 NOTE — PROGRESS NOTES
Non-compliant report chart audits DIABETIC EYE EXAM.   Chart review completed for HM test overdue (mammograms, Colonoscopies, pap smears, DM labs, and/or EYE EXAMs)      Care Everywhere and media, updates requested and reviewed.      RE:  Patient needs diabetic eye exam.    Outreach to patient.      Portal message sent      Outreach:  Diabetic eye exam

## 2022-03-07 NOTE — PROGRESS NOTES
"Date of Encounter: 3/7/2022  Provider: Rebecca Gutierrez MD  Referring MD: Madeleine Cerna MD  PCP: Junior Maddox MD  Asthma, Immunology and Allergy: Lamonte Morales MD   ENT: MD Rivas    CC: possible vocal cord dysfunction/vocal cord polyp    HPI:    Patient is a 71-year-old male who was referred for evaluation treatment of vocal cord function by Dr. Madeleine Cerna.  Patient has a significant history of allergies.    Patient reports a history of voice changes X 6-8 months but has gotten worse within the last 6 weeks." He just does not have a voice". His voice is gravely. He can no longer sing X 6-8 months.  He uses his voice for work. He now has to strain his voice. He denies dysphagia, odynophagia and otalgia. Patient states that he has "crud" in this throat and he can't cough it up. 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    He has a hx of allergies and gets weekly immunotherapy. He has not gone recently because he now has a sinus infection--coughing, green nasal dc. Patient has not had antibiotics for this infection. He is typically treated with medrol dose cheryl and antibiotics.    Hx of FESS in the 90's by Dr Hathaway.    ROS: see HPI  Constitutional: Negative for activity change and appetite change, weight loss.   Eyes: Negative for discharge, visual changes.   Respiratory: Negative for difficulty breathing and wheezing   Cardiovascular: Negative for chest pain.   Gastrointestinal: Negative for abdominal distention and abdominal pain.   Endocrine: Negative for cold intolerance and heat intolerance.   Genitourinary: Negative for dysuria.   Musculoskeletal: Negative for gait problem, muscle pain and joint swelling.   Skin: Negative for color change and pallor; negative for skin lesions.   Neurological: Negative for syncope and weakness; no numbness face.   Psychiatric/Behavioral: Negative for agitation and confusion; negative for depression.    Physical Exam:      Constitutional  · General " Appearance: well nourished, well-developed, alert, oriented, in no acute distress  · Communication: ability, understanding, normal; low volume-mild  Head and Face  · Inspection: normocephalic, atraumatic, no scars, lesions or masses   · Palpation: no stepoffs, sinus tenderness or masses  · Parotid glands: no masses, stones, swelling or tenderness  Eyes  · Ocular Motility / Alignment: normal alignment, motility, no proptosis, enophthalmus or nystagmus  · Conjunctiva: not injected  · Eyelids: no hooding, lag, entropion, or ectropion  Ears  · Hearing: speech reception thresholds grossly normal  · External Ears: no auricle lesions, non-tender, mobile to palpation  · Otoscopy:  · Right Ear: no tympanic membrane lesions, perforations, or effusion, normal EAC  · Left Ear: no tympanic membrane lesions, perforations, or effusion, normal EAC  Nose  · External Nose: no lesions, tenderness, trauma or deformity  · Intranasal Exam: no edema, erythema, discharge, mass or obstruction  Oral Cavity / Oropharynx  · Lips: upper and lower lips pink and moist  · Teeth: good dentition  · Gingiva: healthy  · Oral Mucosa: moist, no mucosal lesions  · Floor of Mouth: normal, no lesions, salivary ducts patent  · Tongue: moist, normal mobility, no lesions  · Palate: soft and hard palates without lesions or ulcers  Oropharynx: tonsils and walls without erythema, exudate, base of tongue soft to palpation  Nasopharynx, Hypopharynx, and Larynx  · Indirect: could not perform exam due to intolerance by patient  Neck  · Inspection and Palpation: no erythema, induration, emphysema, tenderness or masses  · Larynx and Trachea: normal position; normal crepitus  · Thyroid: no tenderness, enlargement or nodules  · Submandibular Glands: no masses or tenderness  Lymphatic:  · Anterior, Posterior, Submandibular, Submental, Supraclavicular: no lymphadenopathy present  Chest / Respiratory  · Chest: no stridor or retractions, normal effort and  expansion  Cardiovascular:  · Pulses: 2+ carotid pulses bilaterally  · Auscultation: deferred  Neurological  · Cranial Nerves: grossly intact  · General: no focal deficits  Psychiatric  · Orientation: oriented to time, place and person  · Mood and Affect: no depression, anxiety or agitation  Extremities  · Inspection: moves all extremities well  Donor site  Chest, Back, Abdomen, Arms, Legs: N/A    PROCEDURES:   -------------------- LARYNGOSCOPY / NASOPHARYNGOSCOPY -------------------------     Pre-op DX:  Dysphonia  Post-op DX: same  Anesthesia: Topical Neosynephrine / Tetracaine  Indications: to look at larynx  Adverse Events: None        Procedure in Detail:     Flexible endoscopy with video was performed through the nasal passages. The nasal cavity, nasopharynx, oropharynx, hypopharynx and larynx were adequately visualized. The true vocal cords and arytenoids were examined during phonation and repose.        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

## 2022-03-07 NOTE — LETTER
March 15, 2022    Mkii Faustin  [address] [facility] [address]             [facility]  [address]  [facility] [address]  Phone: [phone] Dear Miki Faustin    We have tried to reach you by [facility] email unsuccessfully.    Your [facility] primary care team is dedicated to assisting you achieve your health goal.  In order to maintain your goal, scheduling your diabetic health maintenance requirements are alcala to successful disease management.      Junior Maddox MD has reviewed your records and found that you are overdue for your diabetic eye exam.  Yearly eye exams are especially important, as this can identify early eye complications and disease caused by your diabetes.  Junior Maddox MD has requested you schedule your diabetic eye exam and encourages you to schedule at any of the [facility] locations.  You can be seen conveniently at the [facility] location by calling [phone].       If you have already completed this exam at an outside facility, please notify us so we may request a copy of the exam and update your chart.      Sincerely,      Junior Maddox MD and your [facility] Team

## 2022-05-24 ENCOUNTER — PATIENT MESSAGE (OUTPATIENT)
Dept: ADMINISTRATIVE | Facility: HOSPITAL | Age: 72
End: 2022-05-24
Payer: MEDICARE

## 2022-05-30 ENCOUNTER — PATIENT MESSAGE (OUTPATIENT)
Dept: FAMILY MEDICINE | Facility: CLINIC | Age: 72
End: 2022-05-30
Payer: MEDICARE

## 2022-05-31 ENCOUNTER — PATIENT MESSAGE (OUTPATIENT)
Dept: ADMINISTRATIVE | Facility: HOSPITAL | Age: 72
End: 2022-05-31
Payer: MEDICARE

## 2022-07-29 ENCOUNTER — PATIENT MESSAGE (OUTPATIENT)
Dept: ADMINISTRATIVE | Facility: HOSPITAL | Age: 72
End: 2022-07-29
Payer: MEDICARE

## 2022-07-29 ENCOUNTER — PATIENT OUTREACH (OUTPATIENT)
Dept: ADMINISTRATIVE | Facility: HOSPITAL | Age: 72
End: 2022-07-29
Payer: MEDICARE

## 2022-07-29 NOTE — PROGRESS NOTES
Non-compliant report chart audits DIABETIC EYE EXAM.   Chart review completed for HM test overdue (mammograms, Colonoscopies, pap smears, DM labs, and/or EYE EXAMs)      Care Everywhere and media, updates requested and reviewed.      RE:  Patient needs diabetic eye exam.    Outreach to patient.      Fax to Cristiano Marie MD      Outreach:  Diabetic eye exam

## 2022-07-29 NOTE — LETTER
AUTHORIZATION FOR RELEASE OF   CONFIDENTIAL INFORMATION    Dear Cristiano Marie MD,    We are seeing Miki Faustin, date of birth 1950, in the clinic at [facility]. Junior Maddox MD is the patient's PCP. Miki Faustin has an outstanding lab/procedure at the time we reviewed his chart. In order to help keep his health information updated, he has authorized us to request the following medical record(s):        (  )  MAMMOGRAM                                      (  )  COLONOSCOPY      (  )  PAP SMEAR                                          (  )  OUTSIDE LAB RESULTS     (  )  DEXA SCAN                                          (X)  MOST RECENT DIABETIC EYE EXAM          (  )  FOOT EXAM                                          (  )  ENTIRE RECORD     (  )  OUTSIDE IMMUNIZATIONS                 (  )  _______________         Please fax records to [facility], Brandon D Simon-Davis, MD, [phone]    If you have any questions, please contact Víctor Kramer LPN Care Coordinator  at [phone].               Patient Name: Miki Faustin  : 1950  Patient Phone #: [phone]

## 2022-08-04 ENCOUNTER — PATIENT OUTREACH (OUTPATIENT)
Dept: ADMINISTRATIVE | Facility: HOSPITAL | Age: 72
End: 2022-08-04
Payer: MEDICARE

## 2022-08-23 ENCOUNTER — PATIENT MESSAGE (OUTPATIENT)
Dept: ADMINISTRATIVE | Facility: HOSPITAL | Age: 72
End: 2022-08-23
Payer: MEDICARE

## 2022-08-24 ENCOUNTER — PATIENT MESSAGE (OUTPATIENT)
Dept: ADMINISTRATIVE | Facility: HOSPITAL | Age: 72
End: 2022-08-24
Payer: MEDICARE

## 2022-10-03 ENCOUNTER — PATIENT OUTREACH (OUTPATIENT)
Dept: ADMINISTRATIVE | Facility: HOSPITAL | Age: 72
End: 2022-10-03
Payer: MEDICARE

## 2022-10-03 NOTE — PROGRESS NOTES
Uncontrolled A1C [facility] REPORT     LEFT MESSAGE TO RETURN CALL.  PORTAL/ LETTER MSG SENT    DUE FOR ROUTINE OFFICE VISIT     LABS SCHEDULED 10/12/22.   STATES WILL EVENTUALLY SCHEDULE EYE EXAM WITH AN [facility]  THIS TIME.  VERY BUSY  SCHEDULE AND WILL TRY AND MAKE IT NEXT WED FOR LABS IF HIS SCHEDULE IS STILL OPEN. .     Future Appointments   Date Time Provider Department Center   10/12/2022  8:40 AM [facility] LABORATORY [facility] LAB [facility]   11/1/2022 10:50 AM Junior Maddox MD [facility] MED [facility]         Hemoglobin A1C   Date Value Ref Range Status   08/23/2021 6.0 (H) 4.0 - 5.6 % Final     Comment:     ADA Screening Guidelines:  5.7-6.4%  Consistent with prediabetes  >or=6.5%  Consistent with diabetes    High levels of fetal hemoglobin interfere with the HbA1C  assay. Heterozygous hemoglobin variants (HbS, HgC, etc)do  not significantly interfere with this assay.   However, presence of multiple variants may affect accuracy.     02/09/2021 6.0 (H) 4.0 - 5.6 % Final     Comment:     ADA Screening Guidelines:  5.7-6.4%  Consistent with prediabetes  >or=6.5%  Consistent with diabetes    High levels of fetal hemoglobin interfere with the HbA1C  assay. Heterozygous hemoglobin variants (HbS, HgC, etc)do  not significantly interfere with this assay.   However, presence of multiple variants may affect accuracy.

## 2022-10-05 DIAGNOSIS — E11.9 TYPE 2 DIABETES MELLITUS WITHOUT COMPLICATION, UNSPECIFIED WHETHER LONG TERM INSULIN USE: ICD-10-CM

## 2022-10-10 ENCOUNTER — PATIENT MESSAGE (OUTPATIENT)
Dept: ADMINISTRATIVE | Facility: HOSPITAL | Age: 72
End: 2022-10-10
Payer: MEDICARE

## 2022-10-10 ENCOUNTER — PATIENT OUTREACH (OUTPATIENT)
Dept: ADMINISTRATIVE | Facility: HOSPITAL | Age: 72
End: 2022-10-10
Payer: MEDICARE

## 2022-10-12 ENCOUNTER — LAB VISIT (OUTPATIENT)
Dept: LAB | Facility: HOSPITAL | Age: 72
End: 2022-10-12
Attending: INTERNAL MEDICINE
Payer: MEDICARE

## 2022-10-12 DIAGNOSIS — Z00.00 MEDICARE ANNUAL WELLNESS VISIT, SUBSEQUENT: ICD-10-CM

## 2022-10-12 DIAGNOSIS — E78.2 MIXED HYPERLIPIDEMIA: ICD-10-CM

## 2022-10-12 DIAGNOSIS — E11.9 TYPE 2 DIABETES MELLITUS WITHOUT COMPLICATION, WITHOUT LONG-TERM CURRENT USE OF INSULIN: ICD-10-CM

## 2022-10-12 DIAGNOSIS — Z12.5 ENCOUNTER FOR SCREENING FOR MALIGNANT NEOPLASM OF PROSTATE: ICD-10-CM

## 2022-10-12 DIAGNOSIS — I10 ESSENTIAL HYPERTENSION: ICD-10-CM

## 2022-10-12 LAB
ALBUMIN SERPL BCP-MCNC: 4.2 G/DL (ref 3.5–5.2)
ALBUMIN/CREAT UR: 10.6 UG/MG (ref 0–30)
ALP SERPL-CCNC: 91 U/L (ref 55–135)
ALT SERPL W/O P-5'-P-CCNC: 23 U/L (ref 10–44)
ANION GAP SERPL CALC-SCNC: 9 MMOL/L (ref 8–16)
AST SERPL-CCNC: 20 U/L (ref 10–40)
BILIRUB SERPL-MCNC: 0.5 MG/DL (ref 0.1–1)
BILIRUB UR QL STRIP: NEGATIVE
BUN SERPL-MCNC: 23 MG/DL (ref 8–23)
CALCIUM SERPL-MCNC: 10.4 MG/DL (ref 8.7–10.5)
CHLORIDE SERPL-SCNC: 104 MMOL/L (ref 95–110)
CHOLEST SERPL-MCNC: 144 MG/DL (ref 120–199)
CHOLEST/HDLC SERPL: 3.8 {RATIO} (ref 2–5)
CLARITY UR REFRACT.AUTO: CLEAR
CO2 SERPL-SCNC: 25 MMOL/L (ref 23–29)
COLOR UR AUTO: YELLOW
COMPLEXED PSA SERPL-MCNC: 1.6 NG/ML (ref 0–4)
CREAT SERPL-MCNC: 1.1 MG/DL (ref 0.5–1.4)
CREAT UR-MCNC: 47 MG/DL (ref 23–375)
EST. GFR  (NO RACE VARIABLE): >60 ML/MIN/1.73 M^2
ESTIMATED AVG GLUCOSE: 126 MG/DL (ref 68–131)
GLUCOSE SERPL-MCNC: 116 MG/DL (ref 70–110)
GLUCOSE UR QL STRIP: NEGATIVE
HBA1C MFR BLD: 6 % (ref 4–5.6)
HDLC SERPL-MCNC: 38 MG/DL (ref 40–75)
HDLC SERPL: 26.4 % (ref 20–50)
HGB UR QL STRIP: NEGATIVE
KETONES UR QL STRIP: NEGATIVE
LDLC SERPL CALC-MCNC: 91.2 MG/DL (ref 63–159)
LEUKOCYTE ESTERASE UR QL STRIP: NEGATIVE
MICROALBUMIN UR DL<=1MG/L-MCNC: 5 UG/ML
NITRITE UR QL STRIP: NEGATIVE
NONHDLC SERPL-MCNC: 106 MG/DL
PH UR STRIP: 7 [PH] (ref 5–8)
POTASSIUM SERPL-SCNC: 4.6 MMOL/L (ref 3.5–5.1)
PROT SERPL-MCNC: 7.2 G/DL (ref 6–8.4)
PROT UR QL STRIP: NEGATIVE
SODIUM SERPL-SCNC: 138 MMOL/L (ref 136–145)
SP GR UR STRIP: 1.01 (ref 1–1.03)
TRIGL SERPL-MCNC: 74 MG/DL (ref 30–150)
URN SPEC COLLECT METH UR: NORMAL

## 2022-10-12 PROCEDURE — 83036 HEMOGLOBIN GLYCOSYLATED A1C: CPT | Performed by: INTERNAL MEDICINE

## 2022-10-12 PROCEDURE — 80053 COMPREHEN METABOLIC PANEL: CPT | Performed by: INTERNAL MEDICINE

## 2022-10-12 PROCEDURE — 82570 ASSAY OF URINE CREATININE: CPT | Performed by: INTERNAL MEDICINE

## 2022-10-12 PROCEDURE — 36415 COLL VENOUS BLD VENIPUNCTURE: CPT | Mod: PN | Performed by: INTERNAL MEDICINE

## 2022-10-12 PROCEDURE — 84153 ASSAY OF PSA TOTAL: CPT | Performed by: INTERNAL MEDICINE

## 2022-10-12 PROCEDURE — 80061 LIPID PANEL: CPT | Performed by: INTERNAL MEDICINE

## 2022-10-12 PROCEDURE — 81003 URINALYSIS AUTO W/O SCOPE: CPT | Performed by: INTERNAL MEDICINE

## 2022-10-12 PROCEDURE — 82043 UR ALBUMIN QUANTITATIVE: CPT | Performed by: INTERNAL MEDICINE

## 2022-10-13 ENCOUNTER — PATIENT MESSAGE (OUTPATIENT)
Dept: FAMILY MEDICINE | Facility: CLINIC | Age: 72
End: 2022-10-13
Payer: MEDICARE

## 2022-10-14 RX ORDER — BUPROPION HYDROCHLORIDE 150 MG/1
150 TABLET, EXTENDED RELEASE ORAL DAILY
Qty: 90 TABLET | Refills: 0 | Status: SHIPPED | OUTPATIENT
Start: 2022-10-14 | End: 2022-11-01 | Stop reason: SDUPTHER

## 2022-10-14 NOTE — TELEPHONE ENCOUNTER
No new care gaps identified.  [facility] Embedded Care Gaps. Reference number: [id number]. 10/14/2022   9:15:56 AM GASPERT

## 2022-10-20 ENCOUNTER — PATIENT OUTREACH (OUTPATIENT)
Dept: ADMINISTRATIVE | Facility: HOSPITAL | Age: 72
End: 2022-10-20
Payer: MEDICARE

## 2022-10-20 ENCOUNTER — PATIENT MESSAGE (OUTPATIENT)
Dept: ADMINISTRATIVE | Facility: HOSPITAL | Age: 72
End: 2022-10-20
Payer: MEDICARE

## 2022-10-20 NOTE — PROGRESS NOTES
10/20/2022 Care Everywhere updates requested and reviewed.  Immunizations reconciled. Media reports reviewed.  Duplicate HM overrides and  orders removed.  Overdue HM topic chart audit and/or requested.  Overdue lab testing linked to upcoming lab appointments if applies.    Health Maintenance Due   Topic Date Due    Foot Exam  Never done    Eye Exam  Never done

## 2022-10-27 ENCOUNTER — OFFICE VISIT (OUTPATIENT)
Dept: OPTOMETRY | Facility: CLINIC | Age: 72
End: 2022-10-27
Payer: COMMERCIAL

## 2022-10-27 DIAGNOSIS — H25.13 NUCLEAR SCLEROSIS OF BOTH EYES: ICD-10-CM

## 2022-10-27 DIAGNOSIS — H52.7 REFRACTIVE ERROR: ICD-10-CM

## 2022-10-27 DIAGNOSIS — E11.9 TYPE 2 DIABETES MELLITUS WITHOUT RETINOPATHY: Primary | ICD-10-CM

## 2022-10-27 PROCEDURE — 99999 PR PBB SHADOW E&M-EST. PATIENT-LVL III: ICD-10-PCS | Mod: PBBFAC,,, | Performed by: OPTOMETRIST

## 2022-10-27 PROCEDURE — 99999 PR PBB SHADOW E&M-EST. PATIENT-LVL III: CPT | Mod: PBBFAC,,, | Performed by: OPTOMETRIST

## 2022-10-27 PROCEDURE — 92004 COMPRE OPH EXAM NEW PT 1/>: CPT | Mod: S$GLB,,, | Performed by: OPTOMETRIST

## 2022-10-27 PROCEDURE — 92015 PR REFRACTION: ICD-10-PCS | Mod: S$GLB,,, | Performed by: OPTOMETRIST

## 2022-10-27 PROCEDURE — 92004 PR EYE EXAM, NEW PATIENT,COMPREHESV: ICD-10-PCS | Mod: S$GLB,,, | Performed by: OPTOMETRIST

## 2022-10-27 PROCEDURE — 92015 DETERMINE REFRACTIVE STATE: CPT | Mod: S$GLB,,, | Performed by: OPTOMETRIST

## 2022-10-27 NOTE — PROGRESS NOTES
HPI    New pt here for annual DM eye exam DLS- 3 years by outside provider     Pt states his DM and HTN are fairly stable.   Needs updated rx, feels like hes not seeing as clear.   Denies F/F and GTTS.     Hemoglobin A1C       Date                     Value               Ref Range             Status                10/12/2022               6.0 (H)             4.0 - 5.6 %           Final              Comment:    ADA Screening Guidelines:  5.7-6.4%  Consistent with   prediabetes  >or=6.5%  Consistent with diabetes    High levels of fetal   hemoglobin interfere with the HbA1C  assay. Heterozygous hemoglobin   variants (HbS, HgC, etc)do  not significantly interfere with this assay.     However, presence of multiple variants may affect accuracy.         08/23/2021               6.0 (H)             4.0 - 5.6 %           Final              Comment:    ADA Screening Guidelines:  5.7-6.4%  Consistent with   prediabetes  >or=6.5%  Consistent with diabetes    High levels of fetal   hemoglobin interfere with the HbA1C  assay. Heterozygous hemoglobin   variants (HbS, HgC, etc)do  not significantly interfere with this assay.     However, presence of multiple variants may affect accuracy.         02/09/2021               6.0 (H)             4.0 - 5.6 %           Final              Comment:    ADA Screening Guidelines:  5.7-6.4%  Consistent with   prediabetes  >or=6.5%  Consistent with diabetes    High levels of fetal   hemoglobin interfere with the HbA1C  assay. Heterozygous hemoglobin   variants (HbS, HgC, etc)do  not significantly interfere with this assay.     However, presence of multiple variants may affect accuracy.    ----------   Last edited by Ashley Godfrey on 10/27/2022  1:06 PM.            Assessment /Plan     For exam results, see Encounter Report.    Type 2 diabetes mellitus without retinopathy    Nuclear sclerosis of both eyes    Refractive error      No diabetic retinopathy, no csme. Return in 1 year for dilated  eye exam.   2. Educated pt on presence of cataracts and effects on vision. No surgery at this time. Recheck in one year.   3. Spectacle Rx given, discussed different options for glasses. RTC 1 year routine eye exam.

## 2022-10-31 ENCOUNTER — PATIENT MESSAGE (OUTPATIENT)
Dept: ADMINISTRATIVE | Facility: HOSPITAL | Age: 72
End: 2022-10-31
Payer: MEDICARE

## 2022-10-31 DIAGNOSIS — R73.02 GLUCOSE INTOLERANCE (IMPAIRED GLUCOSE TOLERANCE): ICD-10-CM

## 2022-10-31 DIAGNOSIS — I10 ESSENTIAL HYPERTENSION: Primary | ICD-10-CM

## 2022-10-31 DIAGNOSIS — E78.2 MIXED HYPERLIPIDEMIA: ICD-10-CM

## 2022-10-31 RX ORDER — ATORVASTATIN CALCIUM 80 MG/1
80 TABLET, FILM COATED ORAL DAILY
Qty: 90 TABLET | Refills: 2 | Status: SHIPPED | OUTPATIENT
Start: 2022-10-31 | End: 2023-05-09 | Stop reason: SDUPTHER

## 2022-10-31 RX ORDER — HYDROCHLOROTHIAZIDE 25 MG/1
25 TABLET ORAL DAILY
Qty: 90 TABLET | Refills: 2 | Status: SHIPPED | OUTPATIENT
Start: 2022-10-31 | End: 2022-11-01

## 2022-10-31 RX ORDER — METFORMIN HYDROCHLORIDE 500 MG/1
500 TABLET ORAL 3 TIMES DAILY
Qty: 270 TABLET | Refills: 2 | Status: SHIPPED | OUTPATIENT
Start: 2022-10-31 | End: 2023-05-09 | Stop reason: SDUPTHER

## 2022-10-31 RX ORDER — LISINOPRIL 40 MG/1
40 TABLET ORAL DAILY
Qty: 90 TABLET | Refills: 2 | Status: SHIPPED | OUTPATIENT
Start: 2022-10-31 | End: 2023-05-09 | Stop reason: SDUPTHER

## 2022-10-31 RX ORDER — DILTIAZEM HYDROCHLORIDE 240 MG/1
240 CAPSULE, EXTENDED RELEASE ORAL DAILY
Qty: 90 CAPSULE | Refills: 2 | Status: SHIPPED | OUTPATIENT
Start: 2022-10-31 | End: 2023-05-09 | Stop reason: SDUPTHER

## 2022-10-31 NOTE — TELEPHONE ENCOUNTER
No new care gaps identified.  [facility] Embedded Care Gaps. Reference number: [id number]. 10/31/2022   10:43:39 AM GASPERT

## 2022-11-01 ENCOUNTER — OFFICE VISIT (OUTPATIENT)
Dept: FAMILY MEDICINE | Facility: CLINIC | Age: 72
End: 2022-11-01
Payer: MEDICARE

## 2022-11-01 VITALS
HEIGHT: 66 IN | BODY MASS INDEX: 29.58 KG/M2 | HEART RATE: 71 BPM | TEMPERATURE: 99 F | DIASTOLIC BLOOD PRESSURE: 88 MMHG | OXYGEN SATURATION: 97 % | WEIGHT: 184.06 LBS | RESPIRATION RATE: 16 BRPM | SYSTOLIC BLOOD PRESSURE: 132 MMHG

## 2022-11-01 DIAGNOSIS — Z01.89 ENCOUNTER FOR ROUTINE LABORATORY TESTING: ICD-10-CM

## 2022-11-01 DIAGNOSIS — F41.9 ANXIETY: ICD-10-CM

## 2022-11-01 DIAGNOSIS — D22.9 NUMEROUS MOLES: ICD-10-CM

## 2022-11-01 DIAGNOSIS — L98.9 FINGER LESION: Primary | ICD-10-CM

## 2022-11-01 DIAGNOSIS — N39.43 BENIGN PROSTATIC HYPERPLASIA WITH POST-VOID DRIBBLING: ICD-10-CM

## 2022-11-01 DIAGNOSIS — E78.2 MIXED HYPERLIPIDEMIA: ICD-10-CM

## 2022-11-01 DIAGNOSIS — E11.9 TYPE 2 DIABETES MELLITUS WITHOUT COMPLICATION, WITHOUT LONG-TERM CURRENT USE OF INSULIN: ICD-10-CM

## 2022-11-01 DIAGNOSIS — K21.9 LARYNGOPHARYNGEAL REFLUX (LPR): ICD-10-CM

## 2022-11-01 DIAGNOSIS — N40.1 BENIGN PROSTATIC HYPERPLASIA WITH POST-VOID DRIBBLING: ICD-10-CM

## 2022-11-01 DIAGNOSIS — I10 ESSENTIAL HYPERTENSION: ICD-10-CM

## 2022-11-01 DIAGNOSIS — L72.3 SEBACEOUS CYST: ICD-10-CM

## 2022-11-01 DIAGNOSIS — J30.89 ENVIRONMENTAL AND SEASONAL ALLERGIES: ICD-10-CM

## 2022-11-01 PROCEDURE — 99214 PR OFFICE/OUTPT VISIT, EST, LEVL IV, 30-39 MIN: ICD-10-PCS | Mod: S$PBB,,, | Performed by: INTERNAL MEDICINE

## 2022-11-01 PROCEDURE — 99215 OFFICE O/P EST HI 40 MIN: CPT | Mod: PBBFAC,PN | Performed by: INTERNAL MEDICINE

## 2022-11-01 PROCEDURE — 99214 OFFICE O/P EST MOD 30 MIN: CPT | Mod: S$PBB,,, | Performed by: INTERNAL MEDICINE

## 2022-11-01 PROCEDURE — 99999 PR PBB SHADOW E&M-EST. PATIENT-LVL V: ICD-10-PCS | Mod: PBBFAC,,, | Performed by: INTERNAL MEDICINE

## 2022-11-01 PROCEDURE — 99999 PR PBB SHADOW E&M-EST. PATIENT-LVL V: CPT | Mod: PBBFAC,,, | Performed by: INTERNAL MEDICINE

## 2022-11-01 RX ORDER — DOXAZOSIN 2 MG/1
2 TABLET ORAL DAILY
Qty: 30 TABLET | Refills: 3 | Status: SHIPPED | OUTPATIENT
Start: 2022-11-01 | End: 2023-05-09 | Stop reason: SDUPTHER

## 2022-11-01 RX ORDER — MONTELUKAST SODIUM 10 MG/1
10 TABLET ORAL DAILY
Qty: 90 TABLET | Refills: 3 | Status: SHIPPED | OUTPATIENT
Start: 2022-11-01

## 2022-11-01 RX ORDER — BUPROPION HYDROCHLORIDE 150 MG/1
150 TABLET, EXTENDED RELEASE ORAL 2 TIMES DAILY
Qty: 180 TABLET | Refills: 3 | Status: SHIPPED | OUTPATIENT
Start: 2022-11-01 | End: 2023-12-11

## 2022-11-01 RX ORDER — ESOMEPRAZOLE MAGNESIUM 40 MG/1
40 CAPSULE, DELAYED RELEASE ORAL
Qty: 90 CAPSULE | Refills: 3 | Status: SHIPPED | OUTPATIENT
Start: 2022-11-01 | End: 2023-11-14

## 2022-11-01 NOTE — PROGRESS NOTES
Subjective:       Patient ID: Miki Faustin is a 71 y.o. male.  Need to get old notes     PSA: 1.6 (( 10/2022   Colonoscopy:  6/2/14 r/c 5   Immunizations: Tdap: 4/6/17  Pneumovax: 2018  Prevnar 13: 2017  Shingles: 2019  Covid: yes   Smoker: never   Eye: Dr Gifford     Chief Complaint: Fall (Friday ) and Follow-up    HPI    Recent fall walking down bleachers at game  -tripped on step and hit right elbow and knee. No trauma     Having post void dribbling - over active bladder s/s never took meds     Right ring finger lesion- present for 2 yrs. Applying neosporin but not healing.  + skin tag ear.  Will refer to derm for eval.     HTN: controlled;  Rx diltiazem 240, HCTZ 25, lisinopril 40.     Cardic mumur: + Mod AV sclerosis, Mild MR sclerosis//  mild TR, 2021 ;; denies sob or cp //cardio    Anxiety: controlled Rx Wellbutrin  twice daily    Type 2 DM:  A1c 6.0 G 116 // Rx regular metformin 503 pills daily.  Not checking sugars at home.  /highest fasting 143    Insomnia: controlled; ambien 10 mg - cut down to 5 mg due to age     HLD:  LDL goal< 70//due for r.c //  Rx lipitor 80 mg takes 1/2 pill //    Atherosclerosis: + Aorta scattered calcific plaque, coronary artery calcifications.    Pulmonary nodules/mild  Bronchiectasis BLL:  mgmt Pulm  Positive pulmonary nodules < 4 mm bilateral. CT 2021.     Chronic Allergies: mgmt Dr Pacheco  Rx Xyzal, Singular.  & 3 allergies shots weekly   Chronic cough: seen ENT +     Review of Systems:  Review of Systems   HENT:  Negative for drooling.    Eyes:  Negative for pain.   Respiratory:  Negative for choking.    Gastrointestinal:  Negative for blood in stool.   Genitourinary:  Negative for hematuria.   Skin:  Negative for pallor.   Neurological:  Negative for facial asymmetry.   Psychiatric/Behavioral:  Negative for confusion.      Objective:     Vitals:    11/01/22 1056   BP: 132/88   Pulse: 71   Resp: 16   Temp: 98.5 °F (36.9 °C)   TempSrc: Oral   SpO2: 97%  "  Weight: 83.5 kg (184 lb 1.4 oz)   Height: 5' 6" (1.676 m)              Physical Exam  Constitutional:       Appearance: Normal appearance.   HENT:      Head: Normocephalic and atraumatic.      Mouth/Throat:      Pharynx: Oropharynx is clear.   Eyes:      Extraocular Movements: Extraocular movements intact.      Conjunctiva/sclera: Conjunctivae normal.      Pupils: Pupils are equal, round, and reactive to light.   Cardiovascular:      Rate and Rhythm: Normal rate and regular rhythm.      Heart sounds: Murmur heard.      Comments: 3/6 KAITY   Pulmonary:      Effort: Pulmonary effort is normal.      Breath sounds: Normal breath sounds.   Abdominal:      General: Bowel sounds are normal.      Palpations: Abdomen is soft.   Musculoskeletal:         General: Normal range of motion.      Cervical back: Normal range of motion and neck supple.   Skin:     General: Skin is warm and dry.   Neurological:      General: No focal deficit present.      Mental Status: He is alert and oriented to person, place, and time.   Psychiatric:         Mood and Affect: Mood normal.         Assessment & Plan:  1. Finger lesion    2. Benign prostatic hyperplasia with post-void dribbling    3. Numerous moles  - Ambulatory referral/consult to Dermatology; Future    4. Sebaceous cyst  - Ambulatory referral/consult to Dermatology; Future    5. Type 2 diabetes mellitus without complication, without long-term current use of insulin  - Comprehensive Metabolic Panel; Future  - Hemoglobin A1C; Future    6. Essential hypertension  - doxazosin (CARDURA) 2 MG tablet; Take 1 tablet (2 mg total) by mouth once daily. BP and bladder  Dispense: 30 tablet; Refill: 3    7. Laryngopharyngeal reflux (LPR)  - esomeprazole (NEXIUM) 40 MG capsule; Take 1 capsule (40 mg total) by mouth before breakfast.  Dispense: 90 capsule; Refill: 3    8. Environmental and seasonal allergies  - montelukast (SINGULAIR) 10 mg tablet; Take 1 tablet (10 mg total) by mouth once daily.  " Dispense: 90 tablet; Refill: 3    9. Mixed hyperlipidemia  - Lipid Panel; Future    10. Anxiety  - buPROPion (WELLBUTRIN SR) 150 MG TBSR 12 hr tablet; Take 1 tablet (150 mg total) by mouth 2 (two) times daily.  Dispense: 180 tablet; Refill: 3    11. Encounter for routine laboratory testing  - Comprehensive Metabolic Panel; Future  - Hemoglobin A1C; Future  - Lipid Panel; Future

## 2022-11-09 ENCOUNTER — HOSPITAL ENCOUNTER (OUTPATIENT)
Dept: RADIOLOGY | Facility: HOSPITAL | Age: 72
Discharge: HOME | End: 2022-11-09
Attending: INTERNAL MEDICINE
Payer: MEDICARE

## 2022-11-09 DIAGNOSIS — R91.8 MULTIPLE PULMONARY NODULES: ICD-10-CM

## 2022-11-09 PROCEDURE — 71250 CT THORAX DX C-: CPT | Mod: TC,PO

## 2022-11-09 PROCEDURE — 71250 CT THORAX DX C-: CPT | Mod: 26,,, | Performed by: RADIOLOGY

## 2022-11-09 PROCEDURE — 71250 CT CHEST WITHOUT CONTRAST: ICD-10-PCS | Mod: 26,,, | Performed by: RADIOLOGY

## 2022-12-06 ENCOUNTER — PATIENT MESSAGE (OUTPATIENT)
Dept: FAMILY MEDICINE | Facility: CLINIC | Age: 72
End: 2022-12-06
Payer: MEDICARE

## 2022-12-06 DIAGNOSIS — F41.9 ANXIETY: ICD-10-CM

## 2022-12-06 DIAGNOSIS — G47.00 INSOMNIA, UNSPECIFIED TYPE: ICD-10-CM

## 2022-12-07 RX ORDER — ZOLPIDEM TARTRATE 10 MG/1
10 TABLET ORAL NIGHTLY
Qty: 90 TABLET | Refills: 1 | Status: SHIPPED | OUTPATIENT
Start: 2022-12-07 | End: 2023-05-09 | Stop reason: SDUPTHER

## 2022-12-13 ENCOUNTER — PATIENT MESSAGE (OUTPATIENT)
Dept: FAMILY MEDICINE | Facility: CLINIC | Age: 72
End: 2022-12-13
Payer: MEDICARE

## 2022-12-15 ENCOUNTER — PATIENT MESSAGE (OUTPATIENT)
Dept: FAMILY MEDICINE | Facility: CLINIC | Age: 72
End: 2022-12-15
Payer: MEDICARE

## 2023-02-15 ENCOUNTER — PATIENT MESSAGE (OUTPATIENT)
Dept: FAMILY MEDICINE | Facility: CLINIC | Age: 73
End: 2023-02-15
Payer: MEDICARE

## 2023-05-02 ENCOUNTER — LAB VISIT (OUTPATIENT)
Dept: LAB | Facility: HOSPITAL | Age: 73
End: 2023-05-02
Attending: INTERNAL MEDICINE
Payer: MEDICARE

## 2023-05-02 DIAGNOSIS — E78.2 MIXED HYPERLIPIDEMIA: ICD-10-CM

## 2023-05-02 DIAGNOSIS — E11.9 TYPE 2 DIABETES MELLITUS WITHOUT COMPLICATION, WITHOUT LONG-TERM CURRENT USE OF INSULIN: ICD-10-CM

## 2023-05-02 DIAGNOSIS — Z01.89 ENCOUNTER FOR ROUTINE LABORATORY TESTING: ICD-10-CM

## 2023-05-02 LAB
ALBUMIN SERPL BCP-MCNC: 4.2 G/DL (ref 3.5–5.2)
ALP SERPL-CCNC: 83 U/L (ref 55–135)
ALT SERPL W/O P-5'-P-CCNC: 21 U/L (ref 10–44)
ANION GAP SERPL CALC-SCNC: 11 MMOL/L (ref 8–16)
AST SERPL-CCNC: 20 U/L (ref 10–40)
BILIRUB SERPL-MCNC: 0.4 MG/DL (ref 0.1–1)
BUN SERPL-MCNC: 26 MG/DL (ref 8–23)
CALCIUM SERPL-MCNC: 10.4 MG/DL (ref 8.7–10.5)
CHLORIDE SERPL-SCNC: 101 MMOL/L (ref 95–110)
CHOLEST SERPL-MCNC: 171 MG/DL (ref 120–199)
CHOLEST/HDLC SERPL: 4.9 {RATIO} (ref 2–5)
CO2 SERPL-SCNC: 27 MMOL/L (ref 23–29)
CREAT SERPL-MCNC: 1.2 MG/DL (ref 0.5–1.4)
EST. GFR  (NO RACE VARIABLE): >60 ML/MIN/1.73 M^2
ESTIMATED AVG GLUCOSE: 126 MG/DL (ref 68–131)
GLUCOSE SERPL-MCNC: 120 MG/DL (ref 70–110)
HBA1C MFR BLD: 6 % (ref 4–5.6)
HDLC SERPL-MCNC: 35 MG/DL (ref 40–75)
HDLC SERPL: 20.5 % (ref 20–50)
LDLC SERPL CALC-MCNC: 114.8 MG/DL (ref 63–159)
NONHDLC SERPL-MCNC: 136 MG/DL
POTASSIUM SERPL-SCNC: 4.5 MMOL/L (ref 3.5–5.1)
PROT SERPL-MCNC: 7.2 G/DL (ref 6–8.4)
SODIUM SERPL-SCNC: 139 MMOL/L (ref 136–145)
TRIGL SERPL-MCNC: 106 MG/DL (ref 30–150)

## 2023-05-02 PROCEDURE — 80053 COMPREHEN METABOLIC PANEL: CPT | Performed by: INTERNAL MEDICINE

## 2023-05-02 PROCEDURE — 36415 COLL VENOUS BLD VENIPUNCTURE: CPT | Mod: PN | Performed by: INTERNAL MEDICINE

## 2023-05-02 PROCEDURE — 83036 HEMOGLOBIN GLYCOSYLATED A1C: CPT | Performed by: INTERNAL MEDICINE

## 2023-05-02 PROCEDURE — 80061 LIPID PANEL: CPT | Performed by: INTERNAL MEDICINE

## 2023-05-09 ENCOUNTER — OFFICE VISIT (OUTPATIENT)
Dept: FAMILY MEDICINE | Facility: CLINIC | Age: 73
End: 2023-05-09
Payer: MEDICARE

## 2023-05-09 ENCOUNTER — OFFICE VISIT (OUTPATIENT)
Dept: UROLOGY | Facility: CLINIC | Age: 73
End: 2023-05-09
Payer: MEDICARE

## 2023-05-09 VITALS — WEIGHT: 192 LBS | BODY MASS INDEX: 30.86 KG/M2 | HEIGHT: 66 IN

## 2023-05-09 VITALS
BODY MASS INDEX: 30.99 KG/M2 | WEIGHT: 192.81 LBS | RESPIRATION RATE: 16 BRPM | HEIGHT: 66 IN | HEART RATE: 69 BPM | SYSTOLIC BLOOD PRESSURE: 150 MMHG | DIASTOLIC BLOOD PRESSURE: 84 MMHG | OXYGEN SATURATION: 96 %

## 2023-05-09 DIAGNOSIS — R35.0 URINARY FREQUENCY: ICD-10-CM

## 2023-05-09 DIAGNOSIS — G47.00 INSOMNIA, UNSPECIFIED TYPE: ICD-10-CM

## 2023-05-09 DIAGNOSIS — K21.9 HIATAL HERNIA WITH GERD: ICD-10-CM

## 2023-05-09 DIAGNOSIS — K44.9 HIATAL HERNIA WITH GERD: ICD-10-CM

## 2023-05-09 DIAGNOSIS — N39.43 BENIGN PROSTATIC HYPERPLASIA WITH POST-VOID DRIBBLING: ICD-10-CM

## 2023-05-09 DIAGNOSIS — R91.8 MULTIPLE PULMONARY NODULES: ICD-10-CM

## 2023-05-09 DIAGNOSIS — I10 HYPERTENSION, UNSPECIFIED TYPE: ICD-10-CM

## 2023-05-09 DIAGNOSIS — E11.9 TYPE 2 DIABETES MELLITUS WITHOUT COMPLICATION, WITHOUT LONG-TERM CURRENT USE OF INSULIN: ICD-10-CM

## 2023-05-09 DIAGNOSIS — R73.02 GLUCOSE INTOLERANCE (IMPAIRED GLUCOSE TOLERANCE): ICD-10-CM

## 2023-05-09 DIAGNOSIS — R79.89 ABNORMAL CBC: ICD-10-CM

## 2023-05-09 DIAGNOSIS — Z00.00 MEDICARE ANNUAL WELLNESS VISIT, SUBSEQUENT: Primary | ICD-10-CM

## 2023-05-09 DIAGNOSIS — M54.50 LUMBAR BACK PAIN: ICD-10-CM

## 2023-05-09 DIAGNOSIS — R32 URINARY INCONTINENCE, UNSPECIFIED TYPE: ICD-10-CM

## 2023-05-09 DIAGNOSIS — N40.1 BENIGN PROSTATIC HYPERPLASIA WITH POST-VOID DRIBBLING: ICD-10-CM

## 2023-05-09 DIAGNOSIS — R39.15 URINARY URGENCY: ICD-10-CM

## 2023-05-09 DIAGNOSIS — J30.89 ENVIRONMENTAL AND SEASONAL ALLERGIES: ICD-10-CM

## 2023-05-09 DIAGNOSIS — N32.81 OAB (OVERACTIVE BLADDER): ICD-10-CM

## 2023-05-09 DIAGNOSIS — I10 ESSENTIAL HYPERTENSION: ICD-10-CM

## 2023-05-09 DIAGNOSIS — N39.41 URGE INCONTINENCE: ICD-10-CM

## 2023-05-09 DIAGNOSIS — E78.2 MIXED HYPERLIPIDEMIA: ICD-10-CM

## 2023-05-09 DIAGNOSIS — I70.0 AORTIC ATHEROSCLEROSIS: ICD-10-CM

## 2023-05-09 DIAGNOSIS — G57.01 SCIATIC NERVE DISEASE, RIGHT: ICD-10-CM

## 2023-05-09 DIAGNOSIS — K80.20 GALLSTONES: ICD-10-CM

## 2023-05-09 DIAGNOSIS — J47.9 BRONCHIECTASIS WITHOUT COMPLICATION: ICD-10-CM

## 2023-05-09 DIAGNOSIS — I25.10 CORONARY ARTERY DISEASE INVOLVING NATIVE CORONARY ARTERY OF NATIVE HEART WITHOUT ANGINA PECTORIS: ICD-10-CM

## 2023-05-09 DIAGNOSIS — N32.81 OAB (OVERACTIVE BLADDER): Primary | ICD-10-CM

## 2023-05-09 PROBLEM — J43.9 PULMONARY EMPHYSEMA, UNSPECIFIED EMPHYSEMA TYPE: Status: ACTIVE | Noted: 2023-05-09

## 2023-05-09 PROBLEM — J43.9 PULMONARY EMPHYSEMA, UNSPECIFIED EMPHYSEMA TYPE: Status: RESOLVED | Noted: 2023-05-09 | Resolved: 2023-05-09

## 2023-05-09 LAB
BACTERIA #/AREA URNS HPF: NORMAL /HPF
BILIRUBIN, UA POC OHS: NEGATIVE
BLOOD, UA POC OHS: NEGATIVE
CLARITY, UA POC OHS: CLEAR
COLOR, UA POC OHS: YELLOW
GLUCOSE, UA POC OHS: 100
KETONES, UA POC OHS: NEGATIVE
LEUKOCYTES, UA POC OHS: ABNORMAL
MICROSCOPIC COMMENT: NORMAL
NITRITE, UA POC OHS: NEGATIVE
PH, UA POC OHS: 5.5
PROTEIN, UA POC OHS: NEGATIVE
SPECIFIC GRAVITY, UA POC OHS: 1.01
UROBILINOGEN, UA POC OHS: 1
WBC #/AREA URNS HPF: 3 /HPF (ref 0–5)

## 2023-05-09 PROCEDURE — 99214 OFFICE O/P EST MOD 30 MIN: CPT | Mod: PBBFAC,27,PO

## 2023-05-09 PROCEDURE — 99215 OFFICE O/P EST HI 40 MIN: CPT | Mod: PBBFAC,PN | Performed by: INTERNAL MEDICINE

## 2023-05-09 PROCEDURE — 99999 PR PBB SHADOW E&M-EST. PATIENT-LVL V: ICD-10-PCS | Mod: PBBFAC,,, | Performed by: INTERNAL MEDICINE

## 2023-05-09 PROCEDURE — 99999 PR PBB SHADOW E&M-EST. PATIENT-LVL V: CPT | Mod: PBBFAC,,, | Performed by: INTERNAL MEDICINE

## 2023-05-09 PROCEDURE — 99999 PR PBB SHADOW E&M-EST. PATIENT-LVL IV: CPT | Mod: PBBFAC,,,

## 2023-05-09 PROCEDURE — 99999 PR PBB SHADOW E&M-EST. PATIENT-LVL IV: ICD-10-PCS | Mod: PBBFAC,,,

## 2023-05-09 PROCEDURE — G0439 PR MEDICARE ANNUAL WELLNESS SUBSEQUENT VISIT: ICD-10-PCS | Mod: ,,, | Performed by: INTERNAL MEDICINE

## 2023-05-09 PROCEDURE — 99204 OFFICE O/P NEW MOD 45 MIN: CPT | Mod: S$PBB,,,

## 2023-05-09 PROCEDURE — 87086 URINE CULTURE/COLONY COUNT: CPT

## 2023-05-09 PROCEDURE — 81000 URINALYSIS NONAUTO W/SCOPE: CPT | Mod: PO

## 2023-05-09 PROCEDURE — 81003 URINALYSIS AUTO W/O SCOPE: CPT | Mod: PBBFAC,PO

## 2023-05-09 PROCEDURE — 99213 PR OFFICE/OUTPT VISIT, EST, LEVL III, 20-29 MIN: ICD-10-PCS | Mod: S$PBB,25,, | Performed by: INTERNAL MEDICINE

## 2023-05-09 PROCEDURE — 99204 PR OFFICE/OUTPT VISIT, NEW, LEVL IV, 45-59 MIN: ICD-10-PCS | Mod: S$PBB,,,

## 2023-05-09 PROCEDURE — 99213 OFFICE O/P EST LOW 20 MIN: CPT | Mod: S$PBB,25,, | Performed by: INTERNAL MEDICINE

## 2023-05-09 PROCEDURE — G0439 PPPS, SUBSEQ VISIT: HCPCS | Mod: ,,, | Performed by: INTERNAL MEDICINE

## 2023-05-09 PROCEDURE — 51798 US URINE CAPACITY MEASURE: CPT | Mod: PBBFAC,PO

## 2023-05-09 RX ORDER — ATORVASTATIN CALCIUM 80 MG/1
80 TABLET, FILM COATED ORAL DAILY
Qty: 90 TABLET | Refills: 2 | Status: SHIPPED | OUTPATIENT
Start: 2023-05-09

## 2023-05-09 RX ORDER — LISINOPRIL 40 MG/1
40 TABLET ORAL DAILY
Qty: 90 TABLET | Refills: 2 | Status: SHIPPED | OUTPATIENT
Start: 2023-05-09

## 2023-05-09 RX ORDER — DOXAZOSIN 2 MG/1
2 TABLET ORAL DAILY
Qty: 90 TABLET | Refills: 2 | Status: SHIPPED | OUTPATIENT
Start: 2023-05-09 | End: 2024-05-08

## 2023-05-09 RX ORDER — MONTELUKAST SODIUM 10 MG/1
10 TABLET ORAL DAILY
Qty: 90 TABLET | Refills: 3 | Status: CANCELLED | OUTPATIENT
Start: 2023-05-09

## 2023-05-09 RX ORDER — OXYBUTYNIN CHLORIDE 5 MG/1
5 TABLET, EXTENDED RELEASE ORAL DAILY
Qty: 30 TABLET | Refills: 3 | Status: SHIPPED | OUTPATIENT
Start: 2023-05-09 | End: 2023-06-21 | Stop reason: SINTOL

## 2023-05-09 RX ORDER — METFORMIN HYDROCHLORIDE 500 MG/1
500 TABLET ORAL 3 TIMES DAILY
Qty: 270 TABLET | Refills: 2 | Status: SHIPPED | OUTPATIENT
Start: 2023-05-09

## 2023-05-09 RX ORDER — DILTIAZEM HYDROCHLORIDE 240 MG/1
240 CAPSULE, EXTENDED RELEASE ORAL DAILY
Qty: 90 CAPSULE | Refills: 2 | Status: SHIPPED | OUTPATIENT
Start: 2023-05-09

## 2023-05-09 RX ORDER — TIZANIDINE 4 MG/1
4 TABLET ORAL NIGHTLY PRN
Qty: 30 TABLET | Refills: 1 | Status: SHIPPED | OUTPATIENT
Start: 2023-05-09 | End: 2023-11-14

## 2023-05-09 RX ORDER — ZOLPIDEM TARTRATE 10 MG/1
10 TABLET ORAL NIGHTLY
Qty: 90 TABLET | Refills: 1 | Status: SHIPPED | OUTPATIENT
Start: 2023-05-09 | End: 2023-11-14 | Stop reason: SDUPTHER

## 2023-05-09 NOTE — PROGRESS NOTES
[facility] Note  Staff: DORIAN Diamond    PCP: MD Tan    Chief Complaint: OAB    Subjective:        HPI: Miki Faustin is a 72 y.o. male NEW PATIENT presents today for evaluation of OAB. He was seen earlier today by PCP whom started him on oxybutynin 5mg XL. He has not started the medication yet. He states he feels the need to urinate every 1-1.5 hours during the day and wakes up 1-2 times at night to urinate. He states he does leak urine on the way to the bathroom at times. He does have a history of BPH and is currently taking doxazosin 2mg daily. He denies dysuria, hematuria, fever, flank pain, abd pain, and difficulty urinating. He denies a history of kidney stones. He denies a family history of prostate cancer. His most recent PSA from 10/12/2022 was 1.6.     Questions asked the pt during ov today:  Urgency: Yes, urge incontinence? Yes  NTF: 1-2x night, DTF: every 1-1.5 hours  Dysuria: No  Gross Hematuria:No  Straining:No, Hesistancy:No, Intermittency:No}, Weak stream:No    Last PSA Screening:   Lab Results   Component Value Date    PSA 1.6 10/12/2022       History of Kidney Stones?:  No    Constipation issues?:  No    PVR by bladder scan performed by MA today:  0 mL    REVIEW OF SYSTEMS:  Review of Systems   Constitutional: Negative.  Negative for chills and fever.   HENT: Negative.     Eyes: Negative.    Respiratory: Negative.     Cardiovascular: Negative.    Gastrointestinal: Negative.  Negative for abdominal pain, constipation, diarrhea, nausea and vomiting.   Genitourinary:  Positive for frequency and urgency. Negative for dysuria, flank pain and hematuria.   Musculoskeletal: Negative.  Negative for back pain.   Skin: Negative.    Neurological: Negative.    Endo/Heme/Allergies: Negative.    Psychiatric/Behavioral: Negative.       PMHx:  Past Medical History:   Diagnosis Date    Anxiety and depression     Aortic valve sclerosis     Atherosclerosis of arteries      Bronchiectasis     Diabetes mellitus     Environmental and seasonal allergies     chronic - taking shots    Gall stones     2021     Hypertension     Insomnia     Mitral valve sclerosis     Mixed hyperlipidemia     Pulmonary nodules     2021        PSHx:  Past Surgical History:   Procedure Laterality Date    COLONOSCOPY      MOUTH SURGERY      SINUS SURGERY      TONSILLECTOMY         Fam Hx:   malignancies: No    kidney stones: No     Soc Hx:  , [place]    Allergies:  Patient has no known allergies.    Medications: reviewed     Objective:   There were no vitals filed for this visit.    Physical Exam  Constitutional:       Appearance: Normal appearance.   HENT:      Head: Normocephalic.      Mouth/Throat:      Mouth: Mucous membranes are moist.   Eyes:      Conjunctiva/sclera: Conjunctivae normal.   Pulmonary:      Effort: Pulmonary effort is normal.   Abdominal:      General: There is no distension.      Palpations: Abdomen is soft.      Tenderness: There is no abdominal tenderness. There is no right CVA tenderness or left CVA tenderness.   Musculoskeletal:         General: Normal range of motion.      Cervical back: Normal range of motion.   Skin:     General: Skin is warm.   Neurological:      Mental Status: He is alert and oriented to person, place, and time.   Psychiatric:         Mood and Affect: Mood normal.         Behavior: Behavior normal.        EXAM performed by me in office today:  deferred by pt    LABS REVIEW:  UA today:  Color:Clear, Yellow  Spec. Grav.  1.015  PH  5.5  Negative for nitrates, protein, ketones, urobili, bili, and blood.  Positive glucose  Trace leuks    Assessment:       1. Urinary urgency    2. OAB (overactive bladder)    3. Urge incontinence    4. Benign prostatic hyperplasia with post-void dribbling    5. Urinary frequency          Plan:     Continue oxybutynin 5mg XL as prescribed  Continue doxazosin as prescribed  Discussed conservative measures to control  urgency and frequency including avoiding bladder irritants, timed voiding, not postponing voiding, and bowel regimen (as distended bowel has extrinsic compressive effect on bladder. Discussed bladder irritants include coffe (even decaf), tea, alcohol, soda, spicy foods, acidic juices (orange, tomato), vinegar, and artificial sweeteners.    F/u 6 weeks with PVR    [facility]: Active    Linn Schultz, POLO-C

## 2023-05-09 NOTE — PROGRESS NOTES
The following components were reviewed and updated:   Medical history, Family History, Social history,Allergies and Current Medications, Health Risk Assessment, Health Maintenance, Living Situation, Depression Screening.       HRA: patient feels overall is healthy.  Psychosocial and behavioral risks discussed.  BMI - 31  Weight loss discussed.   Diet - well balanced.   ADL: self sufficient in all  Instrumental ADL: patient is able to manage things like their medications and finances.    Memory or cognitive function - Patient has no issues with either   Ambulates normal. No recent falls.   Depression screening is negative.  Hearing--no deficits.  Vision - glasses no deficits.   Incontinence - yes wear brief 2023.   Opiate Screen- Negative     Preventative health needs discussed and patient was given a printed list of what they have received and what they will need with in the next 5-10 years. Recommendations developed using the USPSTF age appropriate recommendations. Education, counseling, and referrals were provided as needed.   Screening schedule reviewed with patient     Advanced Care directive:  yes I recommend living will & POA   (Ie: pick a person who would make decisions for you if you were unable to make them for yourself, called a health care power of , and what kind of decisions you might make such as use of life sustaining treatments such as ventilators, tube feeding when faced with a life limiting illness recorded on a living will.     I have reviewed and updated the patient's current list of providers.     In addition to the patient's preventative review and discussion today, the patient also has other issues to discuss today with a separate summary of plan below:     Subjective:       Patient ID: Miki Faustin is a 72 y.o. male.    Chief Complaint: Results (Review labs ) and Medicare AWV   HPI    PSA: 1.6 (( 10/2022   Colonoscopy:  6/2/14 r/c 5   Immunizations: Tdap: 4/6/17  Pneumovax: 2018   Prevnar 13: 2017  Shingles: 2019  Covid: yes   Smoker: never   Eye: Dr Gifford      Wellness lab review    Back pain with sciatica right leg.  Reports walking in socks stepped over a metal stool slipped and landed directly on the metal stool.  Since then has had on and off right sciatic leg pain.  Happened around November December.  Causing issues with sleeping or sitting driving the car.  Slight improvement with NSAIDs.  Pain On off calf, lat leg and upper thigh.   seen Chiropractic. No xray done.       Pain has been waking him up few times a week at night.     BPH/overactive bladder:  Having issues with urgency now incontinence at times during brief.  Last visit started doxazosin 2 mg for blood pressure and BPH. .  Today appears patient has not filled since January ran out of prescription not sure if it is helpful.  Will have him see Urology.         HTN: uncontrolled Rx Diltiazem 240, Lisinopril 40, off Cardura 2-? 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   Not checking sugars at home.    /highest fasting 143     Insomnia: controlled; ambien 10 mg - cut down to 5 mg due to age      HLD:  LDL goal< 70//due for r.c //  Rx Lipitor 80 mg takes 1/2 pill //     Atherosclerosis: + Aorta scattered calcific plaque, coronary artery calcifications.     Pulmonary nodules/mild  Bronchiectasis BLL:  Stable CT.  Recommended no further exam is needed.    mgmt Pulm  Positive pulmonary nodules < 4 mm bilateral. CT 2021.      Chronic Allergies: mgmt Dr Pacheco  Rx Xyzal, Singular.  & 3 allergies shots weekly   Chronic cough: seen ENT +     GERD HH: controlled Rx Nexium 40    ____________________________________________________________________________________________________  Assessment & Plan:  1. Medicare annual wellness visit, subsequent  - Urinalysis; Future  - Microalbumin/Creatinine Ratio, Urine; Future  - CBC Without Differential; Future  - Comprehensive Metabolic Panel; Future  - Hemoglobin A1C; Future  - Lipid Panel; Future  - TSH; Future    2. OAB (overactive bladder)  - Ambulatory referral/consult to Urology; Future  - oxybutynin (DITROPAN-XL) 5 MG TR24; Take 1 tablet (5 mg total) by mouth once daily. Over active bladder  Dispense: 30 tablet; Refill: 3    3. Benign prostatic hyperplasia with post-void dribbling  - Ambulatory referral/consult to Urology; Future    4. Urinary incontinence, unspecified type  - Ambulatory referral/consult to Urology; Future  - oxybutynin (DITROPAN-XL) 5 MG TR24; Take 1 tablet (5 mg total) by mouth once daily. Over active bladder  Dispense: 30 tablet; Refill: 3    5. Lumbar back pain  - X-Ray Lumbar Spine AP And Lateral; Future    6. Sciatic nerve disease, right  - X-Ray Lumbar Spine AP And Lateral; Future    7. Type 2 diabetes mellitus without complication, without long-term current use of insulin  - Urinalysis; Future  - Microalbumin/Creatinine Ratio, Urine; Future  - Comprehensive Metabolic Panel; Future  - Hemoglobin A1C; Future    8. Coronary artery disease involving native coronary artery of native heart without angina pectoris    9. Multiple pulmonary nodules    10. Gallstones    11. Aortic atherosclerosis    12. Essential hypertension  - doxazosin (CARDURA) 2 MG tablet; Take 1 tablet (2 mg total) by mouth once daily. BP and bladder  Dispense: 90 tablet; Refill: 2    13. Mixed hyperlipidemia  - Lipid Panel; Future  - atorvastatin (LIPITOR) 80 MG tablet; Take 1 tablet (80 mg total) by mouth once daily.  Dispense: 90 tablet; Refill: 2    14. Hypertension, unspecified type  - TSH; Future    15. Abnormal CBC  - CBC Without Differential;  Future    16. Environmental and seasonal allergies    17. Bronchiectasis without complication    18. Glucose intolerance (impaired glucose tolerance)  - metFORMIN (GLUCOPHAGE) 500 MG tablet; Take 1 tablet (500 mg total) by mouth 3 (three) times daily.  Dispense: 270 tablet; Refill: 2    19. Hiatal hernia with GERD     Medicare annual wellness visit, subsequent  -     Urinalysis; Future; Expected date: 05/09/2023  -     Microalbumin/Creatinine Ratio, Urine; Future; Expected date: 05/09/2023  -     CBC Without Differential; Future; Expected date: 05/09/2023  -     Comprehensive Metabolic Panel; Future; Expected date: 05/09/2023  -     Hemoglobin A1C; Future; Expected date: 05/09/2023  -     Lipid Panel; Future; Expected date: 05/09/2023  -     TSH; Future; Expected date: 05/09/2023    OAB (overactive bladder)  -     Ambulatory referral/consult to Urology; Future; Expected date: 05/16/2023  -     oxybutynin (DITROPAN-XL) 5 MG TR24; Take 1 tablet (5 mg total) by mouth once daily. Over active bladder  Dispense: 30 tablet; Refill: 3    Benign prostatic hyperplasia with post-void dribbling  -     Ambulatory referral/consult to Urology; Future; Expected date: 05/16/2023    Urinary incontinence, unspecified type  -     Ambulatory referral/consult to Urology; Future; Expected date: 05/16/2023  -     oxybutynin (DITROPAN-XL) 5 MG TR24; Take 1 tablet (5 mg total) by mouth once daily. Over active bladder  Dispense: 30 tablet; Refill: 3    Lumbar back pain  -     X-Ray Lumbar Spine AP And Lateral; Future; Expected date: 05/09/2023    Sciatic nerve disease, right  Comments:  xray   Orders:  -     X-Ray Lumbar Spine AP And Lateral; Future; Expected date: 05/09/2023    Type 2 diabetes mellitus without complication, without long-term current use of insulin  -     Urinalysis; Future; Expected date: 05/09/2023  -     Microalbumin/Creatinine Ratio, Urine; Future; Expected date: 05/09/2023  -     Comprehensive Metabolic Panel; Future;  Expected date: 05/09/2023  -     Hemoglobin A1C; Future; Expected date: 05/09/2023    Coronary artery disease involving native coronary artery of native heart without angina pectoris    Multiple pulmonary nodules  Comments:  stable      Gallstones    Aortic atherosclerosis    Essential hypertension  -     doxazosin (CARDURA) 2 MG tablet; Take 1 tablet (2 mg total) by mouth once daily. BP and bladder  Dispense: 90 tablet; Refill: 2    Mixed hyperlipidemia  -     Lipid Panel; Future; Expected date: 05/09/2023  -     atorvastatin (LIPITOR) 80 MG tablet; Take 1 tablet (80 mg total) by mouth once daily.  Dispense: 90 tablet; Refill: 2    Hypertension, unspecified type  -     TSH; Future; Expected date: 05/09/2023    Abnormal CBC  -     CBC Without Differential; Future; Expected date: 05/09/2023    Environmental and seasonal allergies    Bronchiectasis without complication    Glucose intolerance (impaired glucose tolerance)  -     metFORMIN (GLUCOPHAGE) 500 MG tablet; Take 1 tablet (500 mg total) by mouth 3 (three) times daily.  Dispense: 270 tablet; Refill: 2    Hiatal hernia with GERD    Other orders  -     tiZANidine (ZANAFLEX) 4 MG tablet; Take 1 tablet (4 mg total) by mouth nightly as needed (muscle relaxer).  Dispense: 30 tablet; Refill: 1        Continue to work on regular exercise, maintain healthy weight, balanced diet. Avoid unhealthy habits: smoking, excessive alcohol intake.     Disclaimer: This note was partly generated using dictation software which may occasionally result in transcription errors  ____________________________________________________________________________________________________  Review of Systems:  Review of Systems   Constitutional:  Negative for chills.   HENT:  Negative for drooling.    Eyes:  Negative for pain.   Respiratory:  Negative for choking.    Cardiovascular:  Negative for chest pain.   Gastrointestinal:  Negative for blood in stool.   Genitourinary:  Positive for frequency and  urgency. Negative for hematuria.   Musculoskeletal:  Positive for back pain. Negative for joint swelling.   Skin:  Negative for pallor.   Neurological:  Negative for facial asymmetry.   Psychiatric/Behavioral:  Negative for confusion.      Objective:     Wt Readings from Last 3 Encounters:   05/09/23 87.5 kg (192 lb 12.7 oz)   11/01/22 83.5 kg (184 lb 1.4 oz)   03/07/22 84.3 kg (185 lb 13.6 oz)     BP Readings from Last 3 Encounters:   05/09/23 (!) 150/84   11/01/22 132/88   03/07/22 (!) 158/88       Lab Results   Component Value Date    WBC 8.11 08/23/2021    HGB 16.2 08/23/2021    HCT 49.1 08/23/2021     08/23/2021     05/02/2023    K 4.5 05/02/2023     05/02/2023    ALT 21 05/02/2023    AST 20 05/02/2023    CO2 27 05/02/2023    CREATININE 1.2 05/02/2023    BUN 26 (H) 05/02/2023    PSA 1.6 10/12/2022     (H) 05/02/2023      Hemoglobin A1C   Date Value Ref Range Status   05/02/2023 6.0 (H) 4.0 - 5.6 % Final     Comment:     ADA Screening Guidelines:  5.7-6.4%  Consistent with prediabetes  >or=6.5%  Consistent with diabetes    High levels of fetal hemoglobin interfere with the HbA1C  assay. Heterozygous hemoglobin variants (HbS, HgC, etc)do  not significantly interfere with this assay.   However, presence of multiple variants may affect accuracy.     10/12/2022 6.0 (H) 4.0 - 5.6 % Final     Comment:     ADA Screening Guidelines:  5.7-6.4%  Consistent with prediabetes  >or=6.5%  Consistent with diabetes    High levels of fetal hemoglobin interfere with the HbA1C  assay. Heterozygous hemoglobin variants (HbS, HgC, etc)do  not significantly interfere with this assay.   However, presence of multiple variants may affect accuracy.     08/23/2021 6.0 (H) 4.0 - 5.6 % Final     Comment:     ADA Screening Guidelines:  5.7-6.4%  Consistent with prediabetes  >or=6.5%  Consistent with diabetes    High levels of fetal hemoglobin interfere with the HbA1C  assay. Heterozygous hemoglobin variants (HbS, HgC,  etc)do  not significantly interfere with this assay.   However, presence of multiple variants may affect accuracy.        Lab Results   Component Value Date    TSH 1.544 08/23/2021     No results found for: FREET4  Lab Results   Component Value Date    LDLCALC 114.8 05/02/2023    LDLCALC 91.2 10/12/2022    LDLCALC 115.0 08/23/2021     Lab Results   Component Value Date    TRIG 106 05/02/2023    TRIG 74 10/12/2022    TRIG 145 08/23/2021            Physical Exam  Constitutional:       Appearance: Normal appearance.   HENT:      Head: Normocephalic and atraumatic.   Eyes:      Extraocular Movements: Extraocular movements intact.      Conjunctiva/sclera: Conjunctivae normal.      Pupils: Pupils are equal, round, and reactive to light.   Cardiovascular:      Rate and Rhythm: Normal rate.      Heart sounds: Murmur heard.   Pulmonary:      Effort: Pulmonary effort is normal.   Musculoskeletal:        Back:       Comments: Soreness RL back    Neurological:      Mental Status: He is alert and oriented to person, place, and time.   Psychiatric:         Mood and Affect: Mood normal.       Medication List with Changes/Refills   New Medications    OXYBUTYNIN (DITROPAN-XL) 5 MG TR24    Take 1 tablet (5 mg total) by mouth once daily. Over active bladder    TIZANIDINE (ZANAFLEX) 4 MG TABLET    Take 1 tablet (4 mg total) by mouth nightly as needed (muscle relaxer).   Current Medications    ASPIRIN (ECOTRIN) 81 MG EC TABLET    Take 81 mg by mouth once daily.    BUPROPION (WELLBUTRIN SR) 150 MG TBSR 12 HR TABLET    Take 1 tablet (150 mg total) by mouth 2 (two) times daily.    DILTIAZEM (DILT-XR) 240 MG CDCR    Take 1 capsule (240 mg total) by mouth once daily.    ESOMEPRAZOLE (NEXIUM) 40 MG CAPSULE    Take 1 capsule (40 mg total) by mouth before breakfast.    LEVOCETIRIZINE (XYZAL) 5 MG TABLET    Take 5 mg by mouth every evening.    LISINOPRIL (PRINIVIL,ZESTRIL) 40 MG TABLET    Take 1 tablet (40 mg total) by mouth once daily.     MONTELUKAST (SINGULAIR) 10 MG TABLET    Take 1 tablet (10 mg total) by mouth once daily.    ZOLPIDEM (AMBIEN) 10 MG TAB    Take 1 tablet (10 mg total) by mouth every evening.   Changed and/or Refilled Medications    Modified Medication Previous Medication    ATORVASTATIN (LIPITOR) 80 MG TABLET atorvastatin (LIPITOR) 80 MG tablet       Take 1 tablet (80 mg total) by mouth once daily.    Take 1 tablet (80 mg total) by mouth once daily.    DOXAZOSIN (CARDURA) 2 MG TABLET doxazosin (CARDURA) 2 MG tablet       Take 1 tablet (2 mg total) by mouth once daily. BP and bladder    Take 1 tablet (2 mg total) by mouth once daily. BP and bladder    METFORMIN (GLUCOPHAGE) 500 MG TABLET metFORMIN (GLUCOPHAGE) 500 MG tablet       Take 1 tablet (500 mg total) by mouth 3 (three) times daily.    Take 1 tablet (500 mg total) by mouth 3 (three) times daily.   Discontinued Medications    TRIAMCINOLONE ACETONIDE 0.1% (KENALOG) 0.1 % CREAM    Apply topically 2 (two) times daily.

## 2023-05-09 NOTE — PATIENT INSTRUCTIONS
Counseling and Referral of Other Preventative  (Italic type indicates deductible and co-insurance are waived)    No orders of the defined types were placed in this encounter.     The following information is provided to all patients.  This information is to help you find resources for any of the problems found today that may be affecting your health:                Living healthy guide: www.[facility].[place].[facility]       Understanding Diabetes: www.diabetes.org       Eating healthy: www.cdc.gov/healthyweight      CDC home safety checklist: www.cdc.gov/steadi/patient.html      Agency on Aging: www.goea.[place].[facility]       Alcoholics anonymous (AA): www.aa.org      Physical Activity: www.blu.nih.gov/ys5vzou       Tobacco use: www.quitwithusla.org

## 2023-05-11 LAB — BACTERIA UR CULT: NO GROWTH

## 2023-05-12 ENCOUNTER — HOSPITAL ENCOUNTER (OUTPATIENT)
Dept: RADIOLOGY | Facility: HOSPITAL | Age: 73
Discharge: HOME | End: 2023-05-12
Attending: INTERNAL MEDICINE
Payer: MEDICARE

## 2023-05-12 DIAGNOSIS — G57.01 SCIATIC NERVE DISEASE, RIGHT: ICD-10-CM

## 2023-05-12 DIAGNOSIS — M54.50 LUMBAR BACK PAIN: ICD-10-CM

## 2023-05-12 PROCEDURE — 72100 X-RAY EXAM L-S SPINE 2/3 VWS: CPT | Mod: TC,PN

## 2023-05-12 PROCEDURE — 72100 X-RAY EXAM L-S SPINE 2/3 VWS: CPT | Mod: 26,,, | Performed by: RADIOLOGY

## 2023-05-12 PROCEDURE — 72100 XR LUMBAR SPINE AP AND LATERAL: ICD-10-PCS | Mod: 26,,, | Performed by: RADIOLOGY

## 2023-05-16 ENCOUNTER — PATIENT MESSAGE (OUTPATIENT)
Dept: FAMILY MEDICINE | Facility: CLINIC | Age: 73
End: 2023-05-16
Payer: MEDICARE

## 2023-05-16 DIAGNOSIS — M16.10 HIP ARTHRITIS: ICD-10-CM

## 2023-05-16 DIAGNOSIS — M41.25 OTHER IDIOPATHIC SCOLIOSIS, THORACOLUMBAR REGION: Primary | ICD-10-CM

## 2023-05-16 DIAGNOSIS — M51.37 DDD (DEGENERATIVE DISC DISEASE), LUMBOSACRAL: ICD-10-CM

## 2023-05-16 PROBLEM — M51.379 DDD (DEGENERATIVE DISC DISEASE), LUMBOSACRAL: Status: ACTIVE | Noted: 2023-05-16

## 2023-05-17 ENCOUNTER — PATIENT MESSAGE (OUTPATIENT)
Dept: FAMILY MEDICINE | Facility: CLINIC | Age: 73
End: 2023-05-17
Payer: MEDICARE

## 2023-05-17 DIAGNOSIS — M25.511 RIGHT SHOULDER PAIN, UNSPECIFIED CHRONICITY: Primary | ICD-10-CM

## 2023-05-23 ENCOUNTER — OFFICE VISIT (OUTPATIENT)
Dept: ORTHOPEDICS | Facility: CLINIC | Age: 73
End: 2023-05-23
Payer: MEDICARE

## 2023-05-23 DIAGNOSIS — M54.16 LUMBAR RADICULOPATHY: Primary | ICD-10-CM

## 2023-05-23 PROCEDURE — 99999 PR PBB SHADOW E&M-EST. PATIENT-LVL II: CPT | Mod: PBBFAC,,, | Performed by: ORTHOPAEDIC SURGERY

## 2023-05-23 PROCEDURE — 99212 OFFICE O/P EST SF 10 MIN: CPT | Mod: PBBFAC,PN | Performed by: ORTHOPAEDIC SURGERY

## 2023-05-23 PROCEDURE — 99204 PR OFFICE/OUTPT VISIT, NEW, LEVL IV, 45-59 MIN: ICD-10-PCS | Mod: S$PBB,,, | Performed by: ORTHOPAEDIC SURGERY

## 2023-05-23 PROCEDURE — 99999 PR PBB SHADOW E&M-EST. PATIENT-LVL II: ICD-10-PCS | Mod: PBBFAC,,, | Performed by: ORTHOPAEDIC SURGERY

## 2023-05-23 PROCEDURE — 99204 OFFICE O/P NEW MOD 45 MIN: CPT | Mod: S$PBB,,, | Performed by: ORTHOPAEDIC SURGERY

## 2023-05-24 ENCOUNTER — PATIENT MESSAGE (OUTPATIENT)
Dept: ORTHOPEDICS | Facility: CLINIC | Age: 73
End: 2023-05-24
Payer: MEDICARE

## 2023-05-24 ENCOUNTER — HOSPITAL ENCOUNTER (OUTPATIENT)
Dept: RADIOLOGY | Facility: HOSPITAL | Age: 73
Discharge: HOME | End: 2023-05-24
Attending: ORTHOPAEDIC SURGERY
Payer: MEDICARE

## 2023-05-24 DIAGNOSIS — M54.16 LUMBAR RADICULOPATHY: ICD-10-CM

## 2023-05-24 PROCEDURE — 72148 MRI LUMBAR SPINE W/O DYE: CPT | Mod: 26,,, | Performed by: RADIOLOGY

## 2023-05-24 PROCEDURE — 72148 MRI LUMBAR SPINE WITHOUT CONTRAST: ICD-10-PCS | Mod: 26,,, | Performed by: RADIOLOGY

## 2023-05-24 PROCEDURE — 72148 MRI LUMBAR SPINE W/O DYE: CPT | Mod: TC,PO

## 2023-05-25 DIAGNOSIS — M54.16 LUMBAR RADICULOPATHY: Primary | ICD-10-CM

## 2023-05-30 NOTE — PROGRESS NOTES
Chief Complaint   Patient presents with    Right Femur - Pain       HPI:    This is a 72 y.o. who presents today complaining of right leg pain for 2 months after no known trauma. Pain is throbbing. No change in numbness or tingling. No associated signs or symptoms.      Past Medical History:   Diagnosis Date    Anxiety and depression     Aortic valve sclerosis     Atherosclerosis of arteries     Bronchiectasis     Diabetes mellitus     Environmental and seasonal allergies     chronic - taking shots    Gall stones     2021     Hypertension     Insomnia     Mitral valve sclerosis     Mixed hyperlipidemia     Pulmonary nodules     2021       Past Surgical History:   Procedure Laterality Date    COLONOSCOPY      MOUTH SURGERY      SINUS SURGERY      TONSILLECTOMY        Current Outpatient Medications on File Prior to Visit   Medication Sig Dispense Refill    aspirin (ECOTRIN) 81 MG EC tablet Take 81 mg by mouth once daily.      atorvastatin (LIPITOR) 80 MG tablet Take 1 tablet (80 mg total) by mouth once daily. 90 tablet 2    buPROPion (WELLBUTRIN SR) 150 MG TBSR 12 hr tablet Take 1 tablet (150 mg total) by mouth 2 (two) times daily. 180 tablet 3    diltiaZEM (DILT-XR) 240 MG CDCR Take 1 capsule (240 mg total) by mouth once daily. 90 capsule 2    doxazosin (CARDURA) 2 MG tablet Take 1 tablet (2 mg total) by mouth once daily. BP and bladder 90 tablet 2    esomeprazole (NEXIUM) 40 MG capsule Take 1 capsule (40 mg total) by mouth before breakfast. 90 capsule 3    levocetirizine (XYZAL) 5 MG tablet Take 5 mg by mouth every evening.      lisinopriL (PRINIVIL,ZESTRIL) 40 MG tablet Take 1 tablet (40 mg total) by mouth once daily. 90 tablet 2    metFORMIN (GLUCOPHAGE) 500 MG tablet Take 1 tablet (500 mg total) by mouth 3 (three) times daily. 270 tablet 2    montelukast (SINGULAIR) 10 mg tablet Take 1 tablet (10 mg total) by mouth once daily. 90 tablet 3    oxybutynin (DITROPAN-XL) 5 MG TR24 Take 1 tablet (5 mg total) by mouth  once daily. Over active bladder 30 tablet 3    tiZANidine (ZANAFLEX) 4 MG tablet Take 1 tablet (4 mg total) by mouth nightly as needed (muscle relaxer). 30 tablet 1    zolpidem (AMBIEN) 10 mg Tab Take 1 tablet (10 mg total) by mouth every evening. 90 tablet 1     No current facility-administered medications on file prior to visit.      Review of patient's allergies indicates:  No Known Allergies   Family History not pertinent   Social History     Socioeconomic History    Marital status:    Occupational History    Occupation: retired [employer]     Tobacco Use    Smoking status: Never    Smokeless tobacco: Never   Substance and Sexual Activity    Alcohol use: Never    Drug use: Never    Sexual activity: Yes     Partners: Female     Birth control/protection: None         Review of Systems:   Constitutional:  Denies fever or chills    Eyes:  Denies change in visual acuity    HENT:  Denies nasal congestion or sore throat    Respiratory:  Denies cough or shortness of breath    Cardiovascular:  Denies chest pain or edema    GI:  Denies abdominal pain, nausea, vomiting, bloody stools or diarrhea    :  Denies dysuria    Integument:  Denies rash    Neurologic:  Denies headache, focal weakness or sensory changes    Endocrine:  Denies polyuria or polydipsia    Lymphatic:  Denies swollen glands    Psychiatric:  Denies depression or anxiety       Physical Exam:    Constitutional:  Well developed, well nourished, no acute distress, non-toxic appearance    Integument:  Well hydrated, no rash    Lymphatic:  No lymphadenopathy noted    Neurologic:  Alert & oriented x 3,     Psychiatric:  Speech and behavior appropriate    Gi: abdomen soft  Eyes: EOMI   LLE  Exam performed same as contralateral side and is normal  RLE  +SLR at 30 degrees. +long tract signs. Overall normal alignment. Skin intact. Compartments soft. NVI distally otherwise.       Lumbar radiculopathy  -     MRI Lumbar Spine Without Contrast; Future; Expected  date: 05/23/2023        Will order MRI and call with results.      Answers submitted by the patient for this visit:  Orthopedics Questionnaire (Submitted on 5/22/2023)  unexpected weight change: No  appetite change : No  sleep disturbance: Yes  IMMUNOCOMPROMISED: No  nervous/ anxious: No  dysphoric mood: No  rash: No  visual disturbance: No  eye redness: No  eye pain: No  ear pain: No  tinnitus: No  hearing loss: No  sinus pressure : No  nosebleeds: No  enviro allergies: No  food allergies: No  cough: No  shortness of breath: No  sweating: No  frequency: Yes  difficulty urinating: No  hematuria: No  chest pain: No  palpitations: No  nausea: No  vomiting: No  diarrhea: No  blood in stool: No  constipation: No  headaches: No  dizziness: No  numbness: No  seizures: No  joint swelling: No  myalgia: Yes  weakness: No  back pain: Yes   (Submitted on 5/22/2023)  Chief Complaint: Leg pain  Pain Chronicity: new  History of trauma: No  Onset: more than 1 month ago  Frequency: intermittently  Progression since onset: waxing and waning  injury location: at home  pain- numeric: 4/10  pain location: other  pain quality: dull, tightness  Radiating Pain: Yes  If your pain is radiating, to what part of the body?: right flank  Aggravating factors: lying down  fever: No  inability to bear weight: No  itching: No  joint locking: No  limited range of motion: No  stiffness: Yes  tingling: No  Treatments tried: NSAIDs  physical therapy: not tried  Improvement on treatment: no relief

## 2023-05-31 ENCOUNTER — PATIENT MESSAGE (OUTPATIENT)
Dept: ADMINISTRATIVE | Facility: HOSPITAL | Age: 73
End: 2023-05-31
Payer: MEDICARE

## 2023-06-06 ENCOUNTER — PATIENT MESSAGE (OUTPATIENT)
Dept: DERMATOLOGY | Facility: CLINIC | Age: 73
End: 2023-06-06
Payer: MEDICARE

## 2023-06-07 ENCOUNTER — PATIENT MESSAGE (OUTPATIENT)
Dept: PAIN MEDICINE | Facility: CLINIC | Age: 73
End: 2023-06-07
Payer: MEDICARE

## 2023-06-13 ENCOUNTER — HOSPITAL ENCOUNTER (OUTPATIENT)
Dept: RADIOLOGY | Facility: HOSPITAL | Age: 73
Discharge: HOME | End: 2023-06-13
Attending: ANESTHESIOLOGY
Payer: MEDICARE

## 2023-06-13 ENCOUNTER — OFFICE VISIT (OUTPATIENT)
Dept: PAIN MEDICINE | Facility: CLINIC | Age: 73
End: 2023-06-13
Payer: MEDICARE

## 2023-06-13 VITALS
DIASTOLIC BLOOD PRESSURE: 76 MMHG | HEART RATE: 88 BPM | HEIGHT: 66 IN | BODY MASS INDEX: 30.92 KG/M2 | SYSTOLIC BLOOD PRESSURE: 156 MMHG | WEIGHT: 192.38 LBS

## 2023-06-13 DIAGNOSIS — M43.16 SPONDYLOLISTHESIS OF LUMBAR REGION: ICD-10-CM

## 2023-06-13 DIAGNOSIS — M43.16 SPONDYLOLISTHESIS OF LUMBAR REGION: Primary | ICD-10-CM

## 2023-06-13 DIAGNOSIS — M54.16 LUMBAR RADICULOPATHY: ICD-10-CM

## 2023-06-13 PROCEDURE — 99204 PR OFFICE/OUTPT VISIT, NEW, LEVL IV, 45-59 MIN: ICD-10-PCS | Mod: S$PBB,,, | Performed by: ANESTHESIOLOGY

## 2023-06-13 PROCEDURE — 72120 X-RAY BEND ONLY L-S SPINE: CPT | Mod: 26,,, | Performed by: RADIOLOGY

## 2023-06-13 PROCEDURE — 99999 PR PBB SHADOW E&M-EST. PATIENT-LVL IV: CPT | Mod: PBBFAC,,, | Performed by: ANESTHESIOLOGY

## 2023-06-13 PROCEDURE — 72120 XR LUMBAR SPINE FLEXION AND EXTENSION ONLY: ICD-10-PCS | Mod: 26,,, | Performed by: RADIOLOGY

## 2023-06-13 PROCEDURE — 99999 PR PBB SHADOW E&M-EST. PATIENT-LVL IV: ICD-10-PCS | Mod: PBBFAC,,, | Performed by: ANESTHESIOLOGY

## 2023-06-13 PROCEDURE — 99214 OFFICE O/P EST MOD 30 MIN: CPT | Mod: PBBFAC,PN | Performed by: ANESTHESIOLOGY

## 2023-06-13 PROCEDURE — 99204 OFFICE O/P NEW MOD 45 MIN: CPT | Mod: S$PBB,,, | Performed by: ANESTHESIOLOGY

## 2023-06-13 PROCEDURE — 72120 X-RAY BEND ONLY L-S SPINE: CPT | Mod: TC,PO

## 2023-06-13 NOTE — PROGRESS NOTES
[facility] New Patient Evaluation      Referred by: Dr. Fidencio Lai    PCP:     CC:   Chief Complaint   Patient presents with    Lumbar Spine Pain (L-Spine)    Low-back Pain    Leg Pain    Nerve Pain      No flowsheet data found.      HPI:   Miki Faustin is a 72 y.o. male patient who has a past medical history of Anxiety and depression, Aortic valve sclerosis, Atherosclerosis of arteries, Bronchiectasis, Diabetes mellitus, Environmental and seasonal allergies, Gall stones, Hypertension, Insomnia, Mitral valve sclerosis, Mixed hyperlipidemia, and Pulmonary nodules. He presents with back pain.  He reports his pain started about 3 months ago when he sat down to hard on a steel chair.  At that time he was having severe pain radiating down into his right thigh and right calf.  He reports that over the past week he is had a gradual improvement in his pain.  Today he rates his pain as 1-2/10, intermittent, sharp, tight.  He can occasionally still get severe pain flares to 7/10 and they are unpredictable.  He denies any numbness or weakness.      Pain Intervention History:      Past Spine Surgical History:      Past and current medications:  Antineuropathics:  NSAIDs:  Physical therapy:  Antidepressants:  Muscle relaxers: tizanidine   Opioids:  Antiplatelets/Anticoagulants: aspirin    History:    Current Outpatient Medications:     aspirin (ECOTRIN) 81 MG EC tablet, Take 81 mg by mouth once daily., Disp: , Rfl:     atorvastatin (LIPITOR) 80 MG tablet, Take 1 tablet (80 mg total) by mouth once daily., Disp: 90 tablet, Rfl: 2    buPROPion (WELLBUTRIN SR) 150 MG TBSR 12 hr tablet, Take 1 tablet (150 mg total) by mouth 2 (two) times daily., Disp: 180 tablet, Rfl: 3    diltiaZEM (DILT-XR) 240 MG CDCR, Take 1 capsule (240 mg total) by mouth once daily., Disp: 90 capsule, Rfl: 2    doxazosin (CARDURA) 2 MG tablet, Take 1 tablet (2 mg total) by mouth once daily. BP and bladder, Disp: 90 tablet, Rfl: 2     levocetirizine (XYZAL) 5 MG tablet, Take 5 mg by mouth every evening., Disp: , Rfl:     lisinopriL (PRINIVIL,ZESTRIL) 40 MG tablet, Take 1 tablet (40 mg total) by mouth once daily., Disp: 90 tablet, Rfl: 2    metFORMIN (GLUCOPHAGE) 500 MG tablet, Take 1 tablet (500 mg total) by mouth 3 (three) times daily., Disp: 270 tablet, Rfl: 2    montelukast (SINGULAIR) 10 mg tablet, Take 1 tablet (10 mg total) by mouth once daily., Disp: 90 tablet, Rfl: 3    oxybutynin (DITROPAN-XL) 5 MG TR24, Take 1 tablet (5 mg total) by mouth once daily. Over active bladder, Disp: 30 tablet, Rfl: 3    tiZANidine (ZANAFLEX) 4 MG tablet, Take 1 tablet (4 mg total) by mouth nightly as needed (muscle relaxer)., Disp: 30 tablet, Rfl: 1    zolpidem (AMBIEN) 10 mg Tab, Take 1 tablet (10 mg total) by mouth every evening., Disp: 90 tablet, Rfl: 1    esomeprazole (NEXIUM) 40 MG capsule, Take 1 capsule (40 mg total) by mouth before breakfast., Disp: 90 capsule, Rfl: 3    Past Medical History:   Diagnosis Date    Anxiety and depression     Aortic valve sclerosis     Atherosclerosis of arteries     Bronchiectasis     Diabetes mellitus     Environmental and seasonal allergies     chronic - taking shots    Gall stones     2021     Hypertension     Insomnia     Mitral valve sclerosis     Mixed hyperlipidemia     Pulmonary nodules     2021        Past Surgical History:   Procedure Laterality Date    COLONOSCOPY      MOUTH SURGERY      SINUS SURGERY      TONSILLECTOMY         Family History   Problem Relation Age of Onset    Stroke Father     Glaucoma Neg Hx     Macular degeneration Neg Hx     Retinal detachment Neg Hx        Social History     Socioeconomic History    Marital status:    Occupational History    Occupation: retired [employer]     Tobacco Use    Smoking status: Never    Smokeless tobacco: Never   Substance and Sexual Activity    Alcohol use: Never    Drug use: Never    Sexual activity: Yes     Partners: Female     Birth  "control/protection: None       Review of patient's allergies indicates:  No Known Allergies    Review of Systems:  Twelve point review of systems is negative.    Physical Exam:  Vitals:    06/13/23 0857   BP: (!) 156/76   Pulse: 88   Weight: 87.2 kg (192 lb 5.6 oz)   Height: 5' 6" (1.676 m)   PainSc:   2   PainLoc: Back     Body mass index is 31.05 kg/m².    Gen: NAD  Psych: mood appropriate for given condition  HEENT: eyes anicteric   CV: RRR  HEENT: anicteric   Respiratory: non-labored, no signs of respiratory distress  Abd: non-distended  Skin: warm, dry and intact.  Gait: No antalgic gait.     Sensory:  Intact and symmetrical to light touch in L1-S1 dermatomes bilaterally.    Motor:     Right Left   L2/3 Iliacus Hip flexion  5  5   L3/4 Qudratus Femoris Knee Extension  5  5   L4/5 Tib Anterior Ankle Dorsiflexion   5  5   L5/S1 Extensor Hallicus Longus Great toe extension  5  5   S1/S2 Gastroc/Soleus Plantar Flexion  5  5      Right Left                  Patellar DTR 2+ 2+   Achilles DTR 0 0                      Labs:  Lab Results   Component Value Date    HGBA1C 6.0 (H) 05/02/2023       Lab Results   Component Value Date    WBC 8.11 08/23/2021    HGB 16.2 08/23/2021    HCT 49.1 08/23/2021    MCV 86 08/23/2021     08/23/2021           Imaging:  MRI lumbar spine 5/24/23  FINDINGS:  Alignment: Rotatory levoconvex curvature of the thoracolumbar junction and dextroconvex curvature of the lower lumbar spine.  There is 2 mm retrolisthesis of L1 on L2 and 3 mm retrolisthesis of L2 on L3.  Mild grade 1 anterolisthesis of L4 on L5, 3 mm.     Vertebral column: Vertebral body heights are maintained.  No evidence of an acute fracture or aggressive marrow replacement process. Multilevel moderate to severe disc degeneration with intervertebral disc space narrowing, mixed degenerative endplate changes and marginal osteophyte formation, most pronounced at L1-2 and asymmetrically on the left at L4-5.     Cord: Normal.  " Conus terminates at L1.     Degenerative findings:     T12-L1: Disc is normal configuration.  Mild bilateral facet arthropathy and ligamentum flavum thickening.  No neural foraminal or spinal canal stenosis.  L1-L2: Retrolisthesis.  Severe disc space narrowing.  Right asymmetric diffuse disc bulge and osteophytic ridging.  Mild-to-moderate bilateral facet arthropathy and ligamentum flavum thickening.  Severe right and mild left neural foraminal stenosis.  Mild spinal canal stenosis.  Moderate right lateral recess stenosis.  L2-L3: Retrolisthesis.  Moderate left asymmetric disc space narrowing.  Diffuse disc bulge with osteophytic ridging.  Moderate bilateral facet arthropathy with facet effusions and ligamentum flavum thickening.  Superimposed subligamentous 9 x 4 mm right-sided facet joint synovial cyst.  Moderate to severe left and mild-to-moderate right neural foraminal stenosis.  Moderate spinal canal and left greater than right lateral recess stenosis.  L3-L4: Moderate to severe left asymmetric disc space narrowing.  Diffuse disc bulge and osteophytic ridging.  Moderate bilateral facet arthropathy and ligamentum flavum thickening.  Moderate to severe left and moderate right neural foraminal stenosis.  Mild spinal canal stenosis.  L4-L5: Anterolisthesis with uncovering the disc.  Severe left asymmetric disc space narrowing. Diffuse disc bulge with osteophytic ridging.  Severe left greater than right facet arthropathy with facet effusions.  Ligamentum flavum thickening.  Severe left and moderate right neural foraminal stenosis.  Mild-to-moderate spinal canal and lateral recess stenosis.  L5-S1: Right asymmetric diffuse disc bulge.  Moderate bilateral facet arthropathy and ligamentum flavum thickening.  Mild bilateral neural foraminal stenosis.  No spinal canal stenosis.     Paraspinal muscles & soft tissues: Fatty atrophy of the paraspinal musculature.    Assessment:   Problem List Items Addressed This Visit     None  Visit Diagnoses       Spondylolisthesis of lumbar region    -  Primary    Relevant Orders    X-Ray Lumbar Spine Flexion And Extension Only    Lumbar radiculopathy                  Miki Faustin is a 72 y.o. male patient who has a past medical history of Anxiety and depression, Aortic valve sclerosis, Atherosclerosis of arteries, Bronchiectasis, Diabetes mellitus, Environmental and seasonal allergies, Gall stones, Hypertension, Insomnia, Mitral valve sclerosis, Mixed hyperlipidemia, and Pulmonary nodules. He presents with back pain.  He reports his pain started about 3 months ago when he sat down to hard on a steel chair.  At that time he was having severe pain radiating down into his right thigh and right calf.  He reports that over the past week he is had a gradual improvement in his pain.  Today he rates his pain as 1-2/10, intermittent, sharp, tight.  He can occasionally still get severe pain flares to 7/10 and they are unpredictable.  He denies any numbness or weakness.    - on exam he has full strength of his lower extremities and intact sensation to light touch bilateral L2-S1.  2+ bilateral patellar and 0 bilateral Achilles DTR  - I independently reviewed his lumbar MRI is consistent with multilevel bilateral degenerative changes.  At L2-3 he has a right-sided synovial cyst coming off the facet joint causing moderate to severe right lateral recess narrowing and moderate central canal narrowing.  At L3-4 he has moderate right foraminal narrowing.  At L4-5 he has a smaller facet cyst on the right causing moderate central canal and lateral recess narrowing  - initially his pain was very severe however he reports over the last week it has greatly improved.  We discussed that since he is having improvement in his pain it is best to maximize conservative therapy at this time.  He is scheduled to start formal physical therapy later this week.  He will start PT and maintain PT directed home exercises.  We will  have him follow up in 6-8 weeks.  If he fails to get significant improvement we can consider lumbar KAVON.  He understands that if he feels like his pain begins to worsen with PT she reach out to me and we can get him for KAVON sooner      : Not applicable    Juan Diego Hernández M.D.  Interventional Pain Medicine / Anesthesiology    This note was completed with dictation software and grammatical errors may exist.

## 2023-06-21 ENCOUNTER — OFFICE VISIT (OUTPATIENT)
Dept: UROLOGY | Facility: CLINIC | Age: 73
End: 2023-06-21
Payer: MEDICARE

## 2023-06-21 VITALS — HEIGHT: 66 IN | WEIGHT: 194.44 LBS | TEMPERATURE: 99 F | BODY MASS INDEX: 31.25 KG/M2

## 2023-06-21 DIAGNOSIS — N39.41 URGE INCONTINENCE: ICD-10-CM

## 2023-06-21 DIAGNOSIS — R35.0 URINARY FREQUENCY: ICD-10-CM

## 2023-06-21 DIAGNOSIS — R39.15 URINARY URGENCY: Primary | ICD-10-CM

## 2023-06-21 DIAGNOSIS — N32.81 OAB (OVERACTIVE BLADDER): ICD-10-CM

## 2023-06-21 PROCEDURE — 99213 OFFICE O/P EST LOW 20 MIN: CPT | Mod: S$PBB,,,

## 2023-06-21 PROCEDURE — 99999 PR PBB SHADOW E&M-EST. PATIENT-LVL III: CPT | Mod: PBBFAC,,,

## 2023-06-21 PROCEDURE — 51798 US URINE CAPACITY MEASURE: CPT | Mod: PBBFAC,PO

## 2023-06-21 PROCEDURE — 99213 PR OFFICE/OUTPT VISIT, EST, LEVL III, 20-29 MIN: ICD-10-PCS | Mod: S$PBB,,,

## 2023-06-21 PROCEDURE — 99213 OFFICE O/P EST LOW 20 MIN: CPT | Mod: PBBFAC,PO

## 2023-06-21 PROCEDURE — 99999 PR PBB SHADOW E&M-EST. PATIENT-LVL III: ICD-10-PCS | Mod: PBBFAC,,,

## 2023-06-21 RX ORDER — MIRABEGRON 50 MG/1
50 TABLET, FILM COATED, EXTENDED RELEASE ORAL DAILY
Qty: 30 TABLET | Refills: 11 | Status: SHIPPED | OUTPATIENT
Start: 2023-06-21 | End: 2023-09-20

## 2023-06-21 NOTE — PROGRESS NOTES
[facility] Note  Staff: DORIAN Diamond    PCP: MD Tan    Chief Complaint: Medication Follow Up    Subjective:        HPI: Miki Faustin is a 72 y.o. male presents today for medication follow up. At his last office visit, his PCP had started him on oxybutynin 5mg XL for urinary urgency, frequency, and urge incontinence. He states the medication has helped his urgency. He states he still feels the need to urinate every 1.5-2 hours during the day and he is still leaking urine at times on the way to the bathroom. He is also experiencing dry mouth and constipation. We discussed a trial of another medication that is not an anticholinergic to avoid these side effects and he is willing to try. He denies dysuria, hematuria, abd pain, and difficulty urinating.     Questions asked the pt during ov today:  Urgency: Controlled, urge incontinence? No  NTF: 1-2x night, DTF: every 1.5-2 hours  Dysuria: No  Gross Hematuria:No  Straining:No, Hesistancy:No, Intermittency:No, Weak stream:No    Last PSA Screening:   Lab Results   Component Value Date    PSA 1.6 10/12/2022       History of Kidney Stones?:  No    Constipation issues?:  No    PVR by bladder scan performed by MA today:  0 mL    REVIEW OF SYSTEMS:  Review of Systems   Constitutional: Negative.  Negative for chills and fever.   HENT: Negative.          Dry mouth   Eyes: Negative.    Respiratory: Negative.     Cardiovascular: Negative.    Gastrointestinal:  Positive for constipation. Negative for abdominal pain, diarrhea, nausea and vomiting.   Genitourinary:  Positive for frequency and urgency. Negative for dysuria, flank pain and hematuria.   Musculoskeletal: Negative.  Negative for back pain.   Skin: Negative.    Neurological: Negative.    Endo/Heme/Allergies: Negative.    Psychiatric/Behavioral: Negative.       PMHx:  Past Medical History:   Diagnosis Date    Anxiety and depression     Aortic valve sclerosis     Atherosclerosis of  arteries     Bronchiectasis     Diabetes mellitus     Environmental and seasonal allergies     chronic - taking shots    Gall stones     2021     Hypertension     Insomnia     Mitral valve sclerosis     Mixed hyperlipidemia     Pulmonary nodules     2021        PSHx:  Past Surgical History:   Procedure Laterality Date    COLONOSCOPY      MOUTH SURGERY      SINUS SURGERY      TONSILLECTOMY         Fam Hx:   malignancies: No    kidney stones: No     Soc Hx:  , lives in [place]    Allergies:  Patient has no known allergies.    Medications: reviewed     Objective:     Vitals:    06/21/23 0938   Temp: 98.6 °F (37 °C)       Physical Exam  Constitutional:       Appearance: Normal appearance.   HENT:      Head: Normocephalic.      Mouth/Throat:      Mouth: Mucous membranes are dry.   Eyes:      Conjunctiva/sclera: Conjunctivae normal.   Pulmonary:      Effort: Pulmonary effort is normal.   Abdominal:      General: There is no distension.      Palpations: Abdomen is soft.      Tenderness: There is no abdominal tenderness. There is no right CVA tenderness or left CVA tenderness.   Musculoskeletal:         General: Normal range of motion.      Cervical back: Normal range of motion.   Skin:     General: Skin is warm.   Neurological:      Mental Status: He is alert and oriented to person, place, and time.   Psychiatric:         Mood and Affect: Mood normal.         Behavior: Behavior normal.       LABS REVIEW:  UA today:  pt urinated prior to OV    Assessment:       1. Urinary urgency    2. Urinary frequency    3. Urge incontinence    4. OAB (overactive bladder)          Plan:     Myrbetriq 50mg prescribed to pt today as trial to see if med improves pt's current LUTS.  Benefits, risks and side affects were thoroughly explained to pt today in office with all questions answered.    Pt failed anticholinergic therapy and had significant side effects    F/u 3 months    [facility]: Active    DORIAN Diamond

## 2023-06-23 ENCOUNTER — PATIENT MESSAGE (OUTPATIENT)
Dept: UROLOGY | Facility: CLINIC | Age: 73
End: 2023-06-23
Payer: MEDICARE

## 2023-06-27 ENCOUNTER — CLINICAL SUPPORT (OUTPATIENT)
Dept: REHABILITATION | Facility: HOSPITAL | Age: 73
End: 2023-06-27
Payer: MEDICARE

## 2023-06-27 DIAGNOSIS — M54.41 CHRONIC BILATERAL LOW BACK PAIN WITH RIGHT-SIDED SCIATICA: ICD-10-CM

## 2023-06-27 DIAGNOSIS — M16.10 HIP ARTHRITIS: ICD-10-CM

## 2023-06-27 DIAGNOSIS — M51.37 DDD (DEGENERATIVE DISC DISEASE), LUMBOSACRAL: ICD-10-CM

## 2023-06-27 DIAGNOSIS — M53.86 DECREASED ROM OF LUMBAR SPINE: ICD-10-CM

## 2023-06-27 DIAGNOSIS — M41.25 OTHER IDIOPATHIC SCOLIOSIS, THORACOLUMBAR REGION: ICD-10-CM

## 2023-06-27 DIAGNOSIS — G89.29 CHRONIC BILATERAL LOW BACK PAIN WITH RIGHT-SIDED SCIATICA: ICD-10-CM

## 2023-06-27 PROCEDURE — 97161 PT EVAL LOW COMPLEX 20 MIN: CPT | Mod: PO | Performed by: PHYSICAL THERAPIST

## 2023-06-27 NOTE — PLAN OF CARE
[facility]   Physical Therapy Initial Evaluation      Name: Miki Faustin  Clinic Number: [id number]    Therapy Diagnosis:   Encounter Diagnoses   Name Primary?    Other idiopathic scoliosis, thoracolumbar region     DDD (degenerative disc disease), lumbosacral     Hip arthritis     Chronic bilateral low back pain with right-sided sciatica     Decreased ROM of lumbar spine         Physician: Junior Maddox*    Physician Orders: PT Eval and Treat   Medical Diagnosis from Referral:   Other idiopathic scoliosis, thoracolumbar region   DDD (degenerative disc disease), lumbosacral   Hip arthritis   Evaluation Date: 2023  Authorization Period Expiration: 05/15/2024  Plan of Care Expiration: 2023  Progress Note Due: 2023  Visit # / Visits authorized: 1   FOTO: 1/3    Precautions: Standard, Diabetes, and blood thinners     Time In: 0900  Time Out: 1000  Total Appointment Time (timed & untimed codes): 60 minutes    Subjective     Date of onset: 2023    History of current condition - Miki reports: having 3+ months of low back pain, right sided with referred pain like sciatica on/off down the right leg pending on certain position especially when stepping put the car. No falls noted. No groin pain.     Falls: none    Imaging: imagin2023  Limited lateral views of the lumbar spine demonstrate preserved vertebral body heights. Severe disc height loss at L1-L2 and moderate height loss at L3-L4 and L4-L5. Multilevel facet arthrosis. Levo scoliotic curvature. Grade 1 anterolisthesis of L4 on L5 without evidence of significant motion between dynamic flexion and extension maneuvers. Abdominal aortic atherosclerosis.   Prior Therapy: none  Social History:  lives with their spouse, one story  Occupation: Retired, now does taxes  Prior Level of Function: independent  Current Level of Function: modified independent, increase time noted with home  management.    Pain:  Current 1/10, worst 7/10, best 0/10   Location: right side low back pain   Description: Aching, Dull, Tight, Shooting, and Variable  Aggravating Factors: Sitting, Bending, Flexing, Lifting, and Getting out of bed/chair  Easing Factors: standing, extension    Patients goals: Decrease pain to low back, return to PLOF, learn the HEP      Medical History:   Past Medical History:   Diagnosis Date    Anxiety and depression     Aortic valve sclerosis     Atherosclerosis of arteries     Bronchiectasis     Diabetes mellitus     Environmental and seasonal allergies     chronic - taking shots    Gall stones     2021     Hypertension     Insomnia     Mitral valve sclerosis     Mixed hyperlipidemia     Pulmonary nodules     2021        Surgical History:   Miki Faustin  has a past surgical history that includes Sinus surgery; Mouth surgery; Colonoscopy; and Tonsillectomy.    Medications:   Miki has a current medication list which includes the following prescription(s): aspirin, atorvastatin, bupropion, diltiazem, doxazosin, esomeprazole, levocetirizine, lisinopril, metformin, myrbetriq, montelukast, tizanidine, and zolpidem.    Allergies:   Review of patient's allergies indicates:  No Known Allergies     Objective    Posture observation:  Sitting: lordotic/neutral/kyphotic: neutral  Change in posture: better/worse/same: better  Standing: lordotic/neutral/kyphotic: neutral                               Lateral shift: right/left/nil: nil  Shift relevant: yes/no: no  Observations: redness, swelling and or atrophy noted?    Neurological:  Sensation: Dermatomes     Right Left Comment   L2 (lateral thigh) intact intact    L3 (medial thigh) intact intact    L4 (medial calf) intact intact    L5 (lateral calf) intact intact    S1 (lateral foot) intact intact    S2 (gastro/HS) intact intact    Saddle (cauda equina) intact intact      Myotomes:   Right Left Comment   Hip flexion (L2-3): 4+/5 5/5    Knee extension  "(L3-4): 5/5 5/5    DF (L4-5): 5/5 5/5    Great Toe Ext (L5-S1):   4+/5 5/5    Glut Medius (L5) 4-/5 4/5    Great Toe Flex/HS (S1-S2) 5/5 5/5      DTR:   Right Left Comment   Patellar (L3-4) 2+ 2+    Achilles (S1) 2+ 2+      Movement Loss  Thoracic/Lumbar AROM:     % limitation Pain/Dysfunction/movement   Flexion  (55-60 N)    75% Cannot touch toes  Non-uniform curvature     Extension (25 N)  50%   ASIS does not clear toes  Lack of posterior weight shift     Repeated Movements:  Flexion in standing: increase, NW  Extension in standing: decrease, NB    Bridge test/Endurance: (mean= 76.7 " compared to no low back pain, 172.9") + right side  S/L bridge: + right side     Lumbar:   SLR (with leg dominant pain)-  Slump-  Clonus-    UMN testing:  Cross over sign: -    Resistive Test for Hamstring Syndrome: -  + if buttock pain produced and no pain with prone test.    Palpation for condition/position, PIVM:  point tenderness to lumbar paraspinals (right PSIS, superior SI area, right lumbar paraspinal)    GAIT: Miki ambulates 50 feet with no assistive device with independently.     GAIT DEVIATIONS: decreased claudia, decreased step length to RLE    Transfers: modified independent, increase time noted with sit to stands, favoring left side    Pt/family was provided educational information, including: role of PT, goals for PT, scheduling - pt verbalized understanding. Discussed insurance limitations with pt.             Treatment     Total Treatment time (time-based codes) separate from Evaluation: 5 minutes     Miki received the treatments listed below:      therapeutic exercises to develop strength, endurance, ROM, flexibility, posture, and core stabilization for 5 minutes including:  Supine: glut sets, bridges  Extension in standing, extension in lying    Patient Education and Home Exercises     Education provided:   - yes    Written Home Exercises Provided: Patient instructed to cont prior HEP. Exercises were reviewed and " Miki was able to demonstrate them prior to the end of the session.  Miki demonstrated good  understanding of the education provided. See EMR under Patient Instructions for exercises provided during therapy sessions.    Assessment     Miki is a 72 y.o. male referred to outpatient Physical Therapy with a medical diagnosis of Other idiopathic scoliosis, thoracolumbar region   DDD (degenerative disc disease), lumbosacral   Hip arthritis . Patient presents with low back pain, decreased lumbar mobility, referred right leg pain.    Problem List: pain, decreased ROM, decreased flexibility, decreased strength, decreased balance and stability, decreased motor control, antalgic gait, inability to participate fully in vocation pursuits, and decreased ability to fully participate in recreational/sports related activities.    Patient prognosis is Good.   Patient will benefit from skilled outpatient Physical Therapy to address the deficits stated above and in the chart below, provide patient /family education, and to maximize patientt's level of independence.     Plan of care discussed with patient: Yes  Patient's spiritual, cultural and educational needs considered and patient is agreeable to the plan of care and goals as stated below:     Anticipated Barriers for therapy: none    Medical Necessity is demonstrated by the following  History  Co-morbidities and personal factors that may impact the plan of care Co-morbidities:   advanced age, anxiety, depression, and diabetes    Personal Factors:   no deficits     high   Examination  Body Structures and Functions, activity limitations and participation restrictions that may impact the plan of care Body Regions:   back  lower extremities  upper extremities  trunk    Body Systems:    ROM  strength  balance  gait  transfers  transitions  motor control    Participation Restrictions:   Home management  Community ambulation    Activity limitations:   Learning and applying knowledge  no  deficits    General Tasks and Commands  no deficits    Communication  no deficits    Mobility  lifting and carrying objects  walking    Self care  no deficits    Domestic Life  doing house work (cleaning house, washing dishes, laundry)    Interactions/Relationships  no deficits    Life Areas  no deficits    Community and Social Life  no deficits         high   Clinical Presentation stable and uncomplicated low   Decision Making/ Complexity Score: low     Goals:  Short Term Goals: 4 weeks   Improve lumbar motion by 25% for mobility purposes.  Improve hip/core MMT 1/2 grade for ambulatory purposes.  Report > 25% reduction in low back painful episodes with home management.  Report centralized pain with repeated movements > 50% of the day for mobility purposes.    Long Term Goals: 8 weeks   Be independent with HEP with no limitations.  Demonstrate hip/core > 4/5 for ambulatory purposes.  Report > 50% reduction in low back painful  episodes with centralization noted.  Demonstrate flexion in standing x 5 with no painful limitations.    Plan     Plan of care Certification: 6/27/2023 to 08/25/2023.    Outpatient Physical Therapy 2 times weekly for 8 weeks to include the following interventions: Gait Training, Manual Therapy, Moist Heat/ Ice, Neuromuscular Re-ed, Patient Education, and Therapeutic Activities.

## 2023-07-10 ENCOUNTER — CLINICAL SUPPORT (OUTPATIENT)
Dept: REHABILITATION | Facility: HOSPITAL | Age: 73
End: 2023-07-10
Payer: MEDICARE

## 2023-07-10 DIAGNOSIS — M53.86 DECREASED ROM OF LUMBAR SPINE: ICD-10-CM

## 2023-07-10 DIAGNOSIS — G89.29 CHRONIC BILATERAL LOW BACK PAIN WITH RIGHT-SIDED SCIATICA: Primary | ICD-10-CM

## 2023-07-10 DIAGNOSIS — M54.41 CHRONIC BILATERAL LOW BACK PAIN WITH RIGHT-SIDED SCIATICA: Primary | ICD-10-CM

## 2023-07-10 PROCEDURE — 97112 NEUROMUSCULAR REEDUCATION: CPT | Mod: PO,CQ

## 2023-07-10 PROCEDURE — 97110 THERAPEUTIC EXERCISES: CPT | Mod: PO,CQ

## 2023-07-10 NOTE — PROGRESS NOTES
[facility]   Physical Therapy Treatment Note     Name: Miki Faustin  Clinic Number: [id number]    Therapy Diagnosis:   Encounter Diagnoses   Name Primary?    Chronic bilateral low back pain with right-sided sciatica Yes    Decreased ROM of lumbar spine      Physician: Junior Maddox*    Visit Date: 7/10/2023  Physician Orders: PT Eval and Treat   Medical Diagnosis from Referral:   Other idiopathic scoliosis, thoracolumbar region   DDD (degenerative disc disease), lumbosacral   Hip arthritis   Evaluation Date: 6/27/2023  Authorization Period Expiration: 05/15/2024  Plan of Care Expiration: 08/25/2023  Progress Note Due: 07/28/2023  Visit # / Visits authorized: 1/20   FOTO: 1/3    PTA Visit #: 1/5     Precautions: Standard, Diabetes, and blood thinners     Time In: 0701 AM  Time Out: 0747 AM  Total Billable Time: 30 minutes    SUBJECTIVE     Pt reports: his lower back is okay today and his (R) LE is currently pain free. Patient states he has been tightening core at home and he feels this is really helping. He was compliant with home exercise program.  Response to previous treatment: improvements in pain  Functional change: too soon to determine     Pain: 0/10  Location: right lower back and LE       OBJECTIVE     Objective Measures updated at progress report unless specified.     Treatment     Miki received the treatments listed below:      therapeutic exercises to develop strength, endurance, ROM, flexibility, posture, and core stabilization for 30 minutes including:  Recumbent bike x 5 minutes  Prone on elbows x 3 minutes  LTR x 2 minutes  Supine piriformis stretch x 30 sec x 3 (B)   S/L clamshell (green TB) 2x10  Extension in standing 2x10, 5 sec hold     neuromuscular re-education activities to improve: Balance, Coordination, Proprioception, and Posture for 10 minutes. The following activities were included:  Supine gluteal sets x 2 minutes  Posterior pelvic tilt 2x10 (heavy  tactile and visual cueing)  Supine isometric crunch (ball push down) x 2 minutes   Standing shoulder extensions + TA (Green TB) 2x10    Patient Education and Home Exercises     Home Exercises Provided and Patient Education Provided     Education provided:   - HEP compliance     Written Home Exercises Provided: Patient instructed to cont prior HEP. Exercises were reviewed and Miki was able to demonstrate them prior to the end of the session.  Miki demonstrated good  understanding of the education provided. See EMR under Patient Instructions for exercises provided during therapy sessions    ASSESSMENT     Miki demonstrating increased difficulty achieving isolated posterior pelvic tilt. Tactile and visual cueing provided for proper motor firing pattern as patient often allowed for compensation from upper trunk. Good tolerance to progression of core stabilization activities but cues provided throughout exercise for proper breathing pattern and muscle recruitment. No adverse effects to lumbar extension focused exercises.     Miki Is progressing well towards his goals.  Pt prognosis is Good.     Pt will continue to benefit from skilled outpatient physical therapy to address the deficits listed in the problem list box on initial evaluation, provide pt/family education and to maximize pt's level of independence in the home and community environment.   Pt's spiritual, cultural and educational needs considered and pt agreeable to plan of care and goals.     Anticipated barriers to physical therapy: none    Goals:   Short Term Goals: 4 weeks   Improve lumbar motion by 25% for mobility purposes.  Improve hip/core MMT 1/2 grade for ambulatory purposes.  Report > 25% reduction in low back painful episodes with home management.  Report centralized pain with repeated movements > 50% of the day for mobility purposes.     Long Term Goals: 8 weeks   Be independent with HEP with no limitations.  Demonstrate hip/core > 4/5 for  ambulatory purposes.  Report > 50% reduction in low back painful  episodes with centralization noted.  Demonstrate flexion in standing x 5 with no painful limitations.    PLAN     Continue current POC with emphasis on decreasing pain and improving core and hip stabilization.     Bina Porter, PTA

## 2023-07-12 DIAGNOSIS — D48.5 NEOPLASM OF UNCERTAIN BEHAVIOR OF SKIN: Primary | ICD-10-CM

## 2023-07-14 ENCOUNTER — TELEPHONE (OUTPATIENT)
Dept: DERMATOLOGY | Facility: CLINIC | Age: 73
End: 2023-07-14
Payer: MEDICARE

## 2023-07-17 ENCOUNTER — CLINICAL SUPPORT (OUTPATIENT)
Dept: REHABILITATION | Facility: HOSPITAL | Age: 73
End: 2023-07-17
Payer: MEDICARE

## 2023-07-17 DIAGNOSIS — M54.41 CHRONIC BILATERAL LOW BACK PAIN WITH RIGHT-SIDED SCIATICA: Primary | ICD-10-CM

## 2023-07-17 DIAGNOSIS — M53.86 DECREASED ROM OF LUMBAR SPINE: ICD-10-CM

## 2023-07-17 DIAGNOSIS — G89.29 CHRONIC BILATERAL LOW BACK PAIN WITH RIGHT-SIDED SCIATICA: Primary | ICD-10-CM

## 2023-07-17 PROCEDURE — 97112 NEUROMUSCULAR REEDUCATION: CPT | Mod: PO,CQ

## 2023-07-17 PROCEDURE — 97110 THERAPEUTIC EXERCISES: CPT | Mod: PO,CQ

## 2023-07-17 NOTE — PROGRESS NOTES
[facility]   Physical Therapy Treatment Note     Name: Miki Faustin  Clinic Number: [id number]    Therapy Diagnosis:   Encounter Diagnoses   Name Primary?    Chronic bilateral low back pain with right-sided sciatica Yes    Decreased ROM of lumbar spine      Physician: Junior Maddox*    Visit Date: 7/17/2023  Physician Orders: PT Eval and Treat   Medical Diagnosis from Referral:   Other idiopathic scoliosis, thoracolumbar region   DDD (degenerative disc disease), lumbosacral   Hip arthritis   Evaluation Date: 6/27/2023  Authorization Period Expiration: 05/15/2024  Plan of Care Expiration: 08/25/2023  Progress Note Due: 07/28/2023  Visit # / Visits authorized: 2/20   FOTO: 1/3    PTA Visit #: 2/5     Precautions: Standard, Diabetes, and blood thinners     Time In: 0905 AM  Time Out: 0957 AM  Total Billable Time: 30 minutes    SUBJECTIVE     Pt reports: his lower back has been feeling much better. Patient states his pain returned last night and he feels like lower IT band is locked up. He was compliant with home exercise program.  Response to previous treatment: improvements in pain  Functional change: too soon to determine     Pain: 0/10  Location: right lower back and LE       OBJECTIVE     Objective Measures updated at progress report unless specified.     Treatment     Miki received the treatments listed below:      therapeutic exercises to develop strength, endurance, ROM, flexibility, posture, and core stabilization for 30 minutes including:  Recumbent bike x 5 minutes  Prone on elbows x 3 minutes  LTR x 2 minutes  Supine piriformis stretch x 30 sec x 3 (B)   S/L clamshell (green TB) 2x10  Extension in standing 2x10, 5 sec hold     neuromuscular re-education activities to improve: Balance, Coordination, Proprioception, and Posture for 10 minutes. The following activities were included:  Supine gluteal sets x 2 minutes  Posterior pelvic tilt 2x10 (heavy tactile and visual  cueing)  PPT + bridge x 10  Supine isometric crunch (ball push down) x 2 minutes   Standing shoulder extensions + TA (Green TB) 2x10    Manual therapy for 5 minutes: STM to (R) QL, distal IT band    Patient Education and Home Exercises     Home Exercises Provided and Patient Education Provided     Education provided:   - HEP compliance     Written Home Exercises Provided: Patient instructed to cont prior HEP. Exercises were reviewed and Miki was able to demonstrate them prior to the end of the session.  Miki demonstrated good  understanding of the education provided. See EMR under Patient Instructions for exercises provided during therapy sessions    ASSESSMENT     Miki continues to struggle with posterior pelvic tilts due to decreased coordination and decreased ability to achieve isolated motion. No adverse effects to progression to bridges today. Good tolerance to manual therapy with near complete resolution of pain achieved.     Miki Is progressing well towards his goals.  Pt prognosis is Good.     Pt will continue to benefit from skilled outpatient physical therapy to address the deficits listed in the problem list box on initial evaluation, provide pt/family education and to maximize pt's level of independence in the home and community environment.   Pt's spiritual, cultural and educational needs considered and pt agreeable to plan of care and goals.     Anticipated barriers to physical therapy: none    Goals:   Short Term Goals: 4 weeks   Improve lumbar motion by 25% for mobility purposes.  Improve hip/core MMT 1/2 grade for ambulatory purposes.  Report > 25% reduction in low back painful episodes with home management.  Report centralized pain with repeated movements > 50% of the day for mobility purposes.     Long Term Goals: 8 weeks   Be independent with HEP with no limitations.  Demonstrate hip/core > 4/5 for ambulatory purposes.  Report > 50% reduction in low back painful  episodes with centralization  noted.  Demonstrate flexion in standing x 5 with no painful limitations.    PLAN     Continue current POC with emphasis on decreasing pain and improving core and hip stabilization.     Bina Porter, PTA

## 2023-07-19 ENCOUNTER — PROCEDURE VISIT (OUTPATIENT)
Dept: DERMATOLOGY | Facility: CLINIC | Age: 73
End: 2023-07-19
Payer: MEDICARE

## 2023-07-19 VITALS
DIASTOLIC BLOOD PRESSURE: 92 MMHG | WEIGHT: 190 LBS | BODY MASS INDEX: 30.53 KG/M2 | SYSTOLIC BLOOD PRESSURE: 164 MMHG | HEIGHT: 66 IN | HEART RATE: 90 BPM

## 2023-07-19 DIAGNOSIS — D04.39 SQUAMOUS CELL CARCINOMA IN SITU (SCCIS) OF SKIN OF CHEEK: Primary | ICD-10-CM

## 2023-07-19 PROCEDURE — 17312: ICD-10-PCS | Mod: S$PBB,,, | Performed by: DERMATOLOGY

## 2023-07-19 PROCEDURE — 17311 MOHS 1 STAGE H/N/HF/G: CPT | Mod: PBBFAC,PO | Performed by: DERMATOLOGY

## 2023-07-19 PROCEDURE — 13132 CMPLX RPR F/C/C/M/N/AX/G/H/F: CPT | Mod: PBBFAC,51,PO | Performed by: DERMATOLOGY

## 2023-07-19 PROCEDURE — 17312 MOHS ADDL STAGE: CPT | Mod: S$PBB,,, | Performed by: DERMATOLOGY

## 2023-07-19 PROCEDURE — 17311: ICD-10-PCS | Mod: S$PBB,,, | Performed by: DERMATOLOGY

## 2023-07-19 PROCEDURE — 17311 MOHS 1 STAGE H/N/HF/G: CPT | Mod: S$PBB,,, | Performed by: DERMATOLOGY

## 2023-07-19 PROCEDURE — 17312 MOHS ADDL STAGE: CPT | Mod: PBBFAC,PO | Performed by: DERMATOLOGY

## 2023-07-19 PROCEDURE — 13132 CMPLX RPR F/C/C/M/N/AX/G/H/F: CPT | Mod: S$PBB,51,, | Performed by: DERMATOLOGY

## 2023-07-19 PROCEDURE — 13132 PR RECMPL WND HEAD,FAC,HAND 2.6-7.5 CM: ICD-10-PCS | Mod: S$PBB,51,, | Performed by: DERMATOLOGY

## 2023-07-19 NOTE — PROGRESS NOTES
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  Surgical Preparation: povidone-iodine     Description of Operation:  The nature and purpose of the procedure, associated risks and alternative treatments were explained to the patient in detail. All patient questions were answered completely. An informed operative consent and photography permit were obtained. The tumor location was then identified and marked with agreement by the patient of the correct location. The patient was positioned, prepped, and draped in the usual sterile manner. Local anesthesia was obtained with 2 cc(s) of 1% lidocaine with 1:100,000 epinephrine. The lesion pre-operatively measures 1.0 by 0.8 cm.     1ST STAGE:  A 2+ mm rim of normal appearing skin was marked circumferentially around the lesion after scraping with a curette to define the margin. The area thus outlined was excised at a 45 degree angle. Hemostasis was obtained with electrodesiccation. The specimen was oriented, mapped, and subdivided into at least two sections. Sections were then chromacoded and submitted for horizontal frozen sections. The patient tolerated the procedure well and there were no complications. Upon microscopic examination of the processed horizontal frozen sections of this stage:  Tumor was present at the margin of the specimen; these areas of residual tumor were marked on the reference map with red pencil, pinpointing the location in  which further tissue excision was necessary.  Tumor type noted on stage 1: Squamous cell carcinoma in situ: Epidermis with full-thickness atypia and variable epidermal maturation.     SUBSEQUENT STAGES:  The patient returned to the operating room for additional stages of tumor removal, and prepped in the manner described above. Surgery was directed to the areas having residual tumor, with thin layers of tissue being excised from these regions. A new reference map was prepared during the surgery to maintain precise orientation as described above. Hemostasis was achieved and the excised tissue was processed for microscopic analysis.  These sections were then examined by the Mohs surgeon, and areas of persistent tumor were indicated on the reference map. This process was repeated until the frozen horizontal sections of STAGE 2 revealed no further tumor cells and tumor eradication was considered to be  complete.  Tumor type noted on subsequent stages: No tumor seen.     SUMMARY:  The tumor was extirpated in 2 Mohs Stages resulting in a final defect measuring 1.6 by 1.3 cm².   The final defect extended deep to subcutaneous fat  The patient tolerated the procedure well and no complications were noted.     PHOTOS:       Reg Arriaza MD    Complex Linear Closure Operative Note  Name: Miki Faustin  YOB: 1950  Date: 7/19/2023  Attending Surgeon: Reg Arriaza MD FAAD  Assistants:  Josué Avina - Surgical Technician  Clinical Diagnosis: A 1.6 by 1.3 cm defect secondary to Mohs Micrographic Surgery  Location: left inferior preauricular cheek  Operation: Complex linear closure  Surgical Preparation: broad scrub with povidone-iodine    Description of Operation:  The nature and purpose of the procedure, associated risks and alternative treatments were explained to the patient in detail. All patient questions were answered completely. In order to minimize tension on the closure and avoid compromising the  anatomic contour of the cosmetic region and maximize functional capacity, a complex linear closure was performed. An informed operative consent and photography permit were obtained. The patient was positioned, prepped, and draped in the usual sterile manner. Local anesthesia was obtained with 4 cc(s) of 1% lidocaine with 1:100,000 epinephrine.    The defect edges were debeveled with a #15 scalpel blade. The circular defect was widely undermined in the deep subcutaneous plane at least 100% of the defect diameter to allow for maximum tissue movement. Hemostasis was achieved with spot electrodessication. The defect was closed first utilizing multiple 4-0 Vicryl interrupted buried subcutaneous sutures. This resulted in excellent apposition of the dermis and epidermis, however two redundant areas of tissue were created on opposite sides of the defect. Utilizing a #15 scalpel blade, two Burows triangles were excised to ensure a flat scar. Hemostasis was obtained with spot electrodessication. The skin edges throughout further fastened superficially with 5-0 Nylon. The final length of the repair was 3.7 cm.  The patient tolerated the procedure well and there were no complications.  Polysporin, Telfa and a pressure dressing were applied to sutures after gentle cleansing with saline.    Patient instructed in and provided with instructions for post-op care, will RTC in 7 days for suture removal    Post op meds: None    Photos:        Reg Arriaza MD

## 2023-07-19 NOTE — LETTER
July 20, 2023        Rosario Thompson MD  [address] [facility] [address]             [facility]  [address] [place] [address]  Phone: [phone]   Patient: Miki Faustin   MR Number: [id number]   YOB: 1950   Date of Visit: 7/19/2023   Dear Dr. Thompson,     Miki Faustin was seen at our office today for Mohs Micrographic surgery on the left inferior preauricular cheek. The tumor was extirpated in 2 stages leaving a final defect of 1.6 x 1.3 cm. After discussion of the possible repair options and in order to achieve an excellent cosmetic and functional result, the wound was repaired with a(n) complex linear closure.     The patient will return to our clinic in 7 days for suture removal/wound check.   They will then be returned fully to you care pending any further need for our services. Thank you for allowing us to participate in the care of this patient.    Reg Arriaza MD

## 2023-07-24 ENCOUNTER — CLINICAL SUPPORT (OUTPATIENT)
Dept: REHABILITATION | Facility: HOSPITAL | Age: 73
End: 2023-07-24
Payer: MEDICARE

## 2023-07-24 DIAGNOSIS — M53.86 DECREASED ROM OF LUMBAR SPINE: ICD-10-CM

## 2023-07-24 DIAGNOSIS — G89.29 CHRONIC BILATERAL LOW BACK PAIN WITH RIGHT-SIDED SCIATICA: Primary | ICD-10-CM

## 2023-07-24 DIAGNOSIS — M54.41 CHRONIC BILATERAL LOW BACK PAIN WITH RIGHT-SIDED SCIATICA: Primary | ICD-10-CM

## 2023-07-24 PROCEDURE — 97112 NEUROMUSCULAR REEDUCATION: CPT | Mod: PO,CQ

## 2023-07-24 PROCEDURE — 97110 THERAPEUTIC EXERCISES: CPT | Mod: PO,CQ

## 2023-07-24 NOTE — PROGRESS NOTES
[facility]   Physical Therapy Treatment Note     Name: Miki Faustin  Clinic Number: [id number]    Therapy Diagnosis:   Encounter Diagnoses   Name Primary?    Chronic bilateral low back pain with right-sided sciatica Yes    Decreased ROM of lumbar spine        Physician: Junior Maddox*    Visit Date: 7/24/2023  Physician Orders: PT Eval and Treat   Medical Diagnosis from Referral:   Other idiopathic scoliosis, thoracolumbar region   DDD (degenerative disc disease), lumbosacral   Hip arthritis   Evaluation Date: 6/27/2023  Authorization Period Expiration: 05/15/2024  Plan of Care Expiration: 08/25/2023  Progress Note Due: 07/28/2023  Visit # / Visits authorized: 3/20   FOTO: 1/3    PTA Visit #: 3/5     Precautions: Standard, Diabetes, and blood thinners     Time In: 0855 AM  Time Out: 0940 AM  Total Billable Time: 30 minutes    SUBJECTIVE     Pt reports: his lower back has been feeling much better. Patient states his pain comes and goes and he can go weeks at a time without an episode. He was compliant with home exercise program.  Response to previous treatment: improvements in pain  Functional change: too soon to determine     Pain: 0/10  Location: right lower back and LE       OBJECTIVE     Objective Measures updated at progress report unless specified.     Treatment     Miki received the treatments listed below:      therapeutic exercises to develop strength, endurance, ROM, flexibility, posture, and core stabilization for 30 minutes including:  Recumbent bike x 5 minutes  Prone on elbows x 3 minutes  LTR x 2 minutes  Supine piriformis stretch x 30 sec x 3 (B)   S/L clamshell (green TB) 2x10  Extension in standing 2x10, 5 sec hold   Seated lumbar extension 2x10, 70#     neuromuscular re-education activities to improve: Balance, Coordination, Proprioception, and Posture for 10 minutes. The following activities were included:  Supine gluteal sets x 2 minutes  Prone LE extension x  10   Posterior pelvic tilt 2x10 (heavy tactile and visual cueing)  PPT + bridge with green TB 2x10  Standing isometric crunch (ball push down) x 2 minutes   Standing shoulder extensions + TA (blue TB) 2x10    Manual therapy for 5 minutes: STM to (R) QL, distal IT band    Patient Education and Home Exercises     Home Exercises Provided and Patient Education Provided     Education provided:   - HEP compliance     Written Home Exercises Provided: Patient instructed to cont prior HEP. Exercises were reviewed and Miki was able to demonstrate them prior to the end of the session.  Miki demonstrated good  understanding of the education provided. See EMR under Patient Instructions for exercises provided during therapy sessions    ASSESSMENT     Miki demonstrating much improve posterior pelvic tilt today. Minimal cues needed to correct pelvic elevation but exercise form is much improved. Good tolerance to progression of prone gluteal strengthening with tactile cues needed for proper activation without lumbar compensation. Patient without complaints of pain during today's treatment session.     Miki Is progressing well towards his goals.  Pt prognosis is Good.     Pt will continue to benefit from skilled outpatient physical therapy to address the deficits listed in the problem list box on initial evaluation, provide pt/family education and to maximize pt's level of independence in the home and community environment.   Pt's spiritual, cultural and educational needs considered and pt agreeable to plan of care and goals.     Anticipated barriers to physical therapy: none    Goals:   Short Term Goals: 4 weeks   Improve lumbar motion by 25% for mobility purposes.  Improve hip/core MMT 1/2 grade for ambulatory purposes.  Report > 25% reduction in low back painful episodes with home management.  Report centralized pain with repeated movements > 50% of the day for mobility purposes.     Long Term Goals: 8 weeks   Be independent  with HEP with no limitations.  Demonstrate hip/core > 4/5 for ambulatory purposes.  Report > 50% reduction in low back painful  episodes with centralization noted.  Demonstrate flexion in standing x 5 with no painful limitations.    PLAN     Continue current POC with emphasis on decreasing pain and improving core and hip stabilization.     Bina Porter, PTA

## 2023-07-25 ENCOUNTER — OFFICE VISIT (OUTPATIENT)
Dept: PAIN MEDICINE | Facility: CLINIC | Age: 73
End: 2023-07-25
Payer: MEDICARE

## 2023-07-25 VITALS
SYSTOLIC BLOOD PRESSURE: 178 MMHG | DIASTOLIC BLOOD PRESSURE: 79 MMHG | BODY MASS INDEX: 30.88 KG/M2 | HEIGHT: 66 IN | HEART RATE: 95 BPM | WEIGHT: 192.13 LBS

## 2023-07-25 DIAGNOSIS — M54.16 LUMBAR RADICULOPATHY: Primary | ICD-10-CM

## 2023-07-25 DIAGNOSIS — M43.16 SPONDYLOLISTHESIS OF LUMBAR REGION: ICD-10-CM

## 2023-07-25 PROCEDURE — 99213 OFFICE O/P EST LOW 20 MIN: CPT | Mod: S$PBB,,, | Performed by: ANESTHESIOLOGY

## 2023-07-25 PROCEDURE — 99213 PR OFFICE/OUTPT VISIT, EST, LEVL III, 20-29 MIN: ICD-10-PCS | Mod: S$PBB,,, | Performed by: ANESTHESIOLOGY

## 2023-07-25 PROCEDURE — 99213 OFFICE O/P EST LOW 20 MIN: CPT | Mod: PBBFAC,PN | Performed by: ANESTHESIOLOGY

## 2023-07-25 PROCEDURE — 99999 PR PBB SHADOW E&M-EST. PATIENT-LVL III: ICD-10-PCS | Mod: PBBFAC,,, | Performed by: ANESTHESIOLOGY

## 2023-07-25 PROCEDURE — 99999 PR PBB SHADOW E&M-EST. PATIENT-LVL III: CPT | Mod: PBBFAC,,, | Performed by: ANESTHESIOLOGY

## 2023-07-25 NOTE — PROGRESS NOTES
[facility] Follow Up Evaluation      Referred by: No ref. provider found    PCP:     CC:   Chief Complaint   Patient presents with    Lumbar Spine Pain (L-Spine)    Back Pain      No flowsheet data found.      Interval HPI 7/25/23: Mr. Faustin presents to the office for follow up.  Today he reports near complete resolution of his pain.  He says on occasion and brief intermittent basis he will have some transient right leg pain but overall he is found a significant improvement in his back and leg pain.    HPI:   Miki Faustin is a 72 y.o. male patient who has a past medical history of Anxiety and depression, Aortic valve sclerosis, Atherosclerosis of arteries, Bronchiectasis, Diabetes mellitus, Environmental and seasonal allergies, Gall stones, Hypertension, Insomnia, Mitral valve sclerosis, Mixed hyperlipidemia, and Pulmonary nodules. He presents with back pain.  He reports his pain started about 3 months ago when he sat down to hard on a steel chair.  At that time he was having severe pain radiating down into his right thigh and right calf.  He reports that over the past week he is had a gradual improvement in his pain.  Today he rates his pain as 1-2/10, intermittent, sharp, tight.  He can occasionally still get severe pain flares to 7/10 and they are unpredictable.  He denies any numbness or weakness.      Pain Intervention History:      Past Spine Surgical History:      Past and current medications:  Antineuropathics:  NSAIDs:  Physical therapy:  Antidepressants:  Muscle relaxers: tizanidine   Opioids:  Antiplatelets/Anticoagulants: aspirin    History:    Current Outpatient Medications:     aspirin (ECOTRIN) 81 MG EC tablet, Take 81 mg by mouth once daily., Disp: , Rfl:     atorvastatin (LIPITOR) 80 MG tablet, Take 1 tablet (80 mg total) by mouth once daily., Disp: 90 tablet, Rfl: 2    buPROPion (WELLBUTRIN SR) 150 MG TBSR 12 hr tablet, Take 1 tablet (150 mg total) by mouth 2 (two) times daily., Disp:  180 tablet, Rfl: 3    diltiaZEM (DILT-XR) 240 MG CDCR, Take 1 capsule (240 mg total) by mouth once daily., Disp: 90 capsule, Rfl: 2    doxazosin (CARDURA) 2 MG tablet, Take 1 tablet (2 mg total) by mouth once daily. BP and bladder, Disp: 90 tablet, Rfl: 2    levocetirizine (XYZAL) 5 MG tablet, Take 5 mg by mouth every evening., Disp: , Rfl:     lisinopriL (PRINIVIL,ZESTRIL) 40 MG tablet, Take 1 tablet (40 mg total) by mouth once daily., Disp: 90 tablet, Rfl: 2    metFORMIN (GLUCOPHAGE) 500 MG tablet, Take 1 tablet (500 mg total) by mouth 3 (three) times daily., Disp: 270 tablet, Rfl: 2    mirabegron (MYRBETRIQ) 50 mg Tb24, Take 1 tablet (50 mg total) by mouth once daily., Disp: 30 tablet, Rfl: 11    montelukast (SINGULAIR) 10 mg tablet, Take 1 tablet (10 mg total) by mouth once daily., Disp: 90 tablet, Rfl: 3    tiZANidine (ZANAFLEX) 4 MG tablet, Take 1 tablet (4 mg total) by mouth nightly as needed (muscle relaxer)., Disp: 30 tablet, Rfl: 1    zolpidem (AMBIEN) 10 mg Tab, Take 1 tablet (10 mg total) by mouth every evening., Disp: 90 tablet, Rfl: 1    esomeprazole (NEXIUM) 40 MG capsule, Take 1 capsule (40 mg total) by mouth before breakfast., Disp: 90 capsule, Rfl: 3    Past Medical History:   Diagnosis Date    Anxiety and depression     Aortic valve sclerosis     Atherosclerosis of arteries     Bronchiectasis     Diabetes mellitus     Environmental and seasonal allergies     chronic - taking shots    Gall stones     2021     Hypertension     Insomnia     Mitral valve sclerosis     Mixed hyperlipidemia     Pulmonary nodules     2021        Past Surgical History:   Procedure Laterality Date    COLONOSCOPY      MOUTH SURGERY      SINUS SURGERY      TONSILLECTOMY         Family History   Problem Relation Age of Onset    Stroke Father     Glaucoma Neg Hx     Macular degeneration Neg Hx     Retinal detachment Neg Hx        Social History     Socioeconomic History    Marital status:    Occupational History     "Occupation: retired [employer]     Tobacco Use    Smoking status: Never    Smokeless tobacco: Never   Substance and Sexual Activity    Alcohol use: Never    Drug use: Never    Sexual activity: Yes     Partners: Female     Birth control/protection: None       Review of patient's allergies indicates:  No Known Allergies    Review of Systems:  Twelve point review of systems is negative.    Physical Exam:  Vitals:    07/25/23 0949   BP: (!) 178/79   Pulse: 95   Weight: 87.1 kg (192 lb 2.1 oz)   Height: 5' 6" (1.676 m)   PainSc:   2   PainLoc: Back     Body mass index is 31.01 kg/m².    Gen: NAD  Psych: mood appropriate for given condition  HEENT: eyes anicteric   CV: RRR  HEENT: anicteric   Respiratory: non-labored, no signs of respiratory distress  Abd: non-distended  Skin: warm, dry and intact.  Gait: No antalgic gait.     Sensory:  Intact and symmetrical to light touch in L1-S1 dermatomes bilaterally.    Motor:     Right Left   L2/3 Iliacus Hip flexion  5  5   L3/4 Qudratus Femoris Knee Extension  5  5   L4/5 Tib Anterior Ankle Dorsiflexion   5  5   L5/S1 Extensor Hallicus Longus Great toe extension  5  5   S1/S2 Gastroc/Soleus Plantar Flexion  5  5      Right Left                  Patellar DTR 2+ 2+   Achilles DTR 0 0                      Labs:  Lab Results   Component Value Date    HGBA1C 6.0 (H) 05/02/2023       Lab Results   Component Value Date    WBC 8.11 08/23/2021    HGB 16.2 08/23/2021    HCT 49.1 08/23/2021    MCV 86 08/23/2021     08/23/2021       Imaging:  MRI lumbar spine 5/24/23  FINDINGS:  Alignment: Rotatory levoconvex curvature of the thoracolumbar junction and dextroconvex curvature of the lower lumbar spine.  There is 2 mm retrolisthesis of L1 on L2 and 3 mm retrolisthesis of L2 on L3.  Mild grade 1 anterolisthesis of L4 on L5, 3 mm.     Vertebral column: Vertebral body heights are maintained.  No evidence of an acute fracture or aggressive marrow replacement process. Multilevel moderate to " severe disc degeneration with intervertebral disc space narrowing, mixed degenerative endplate changes and marginal osteophyte formation, most pronounced at L1-2 and asymmetrically on the left at L4-5.     Cord: Normal.  Conus terminates at L1.     Degenerative findings:     T12-L1: Disc is normal configuration.  Mild bilateral facet arthropathy and ligamentum flavum thickening.  No neural foraminal or spinal canal stenosis.  L1-L2: Retrolisthesis.  Severe disc space narrowing.  Right asymmetric diffuse disc bulge and osteophytic ridging.  Mild-to-moderate bilateral facet arthropathy and ligamentum flavum thickening.  Severe right and mild left neural foraminal stenosis.  Mild spinal canal stenosis.  Moderate right lateral recess stenosis.  L2-L3: Retrolisthesis.  Moderate left asymmetric disc space narrowing.  Diffuse disc bulge with osteophytic ridging.  Moderate bilateral facet arthropathy with facet effusions and ligamentum flavum thickening.  Superimposed subligamentous 9 x 4 mm right-sided facet joint synovial cyst.  Moderate to severe left and mild-to-moderate right neural foraminal stenosis.  Moderate spinal canal and left greater than right lateral recess stenosis.  L3-L4: Moderate to severe left asymmetric disc space narrowing.  Diffuse disc bulge and osteophytic ridging.  Moderate bilateral facet arthropathy and ligamentum flavum thickening.  Moderate to severe left and moderate right neural foraminal stenosis.  Mild spinal canal stenosis.  L4-L5: Anterolisthesis with uncovering the disc.  Severe left asymmetric disc space narrowing. Diffuse disc bulge with osteophytic ridging.  Severe left greater than right facet arthropathy with facet effusions.  Ligamentum flavum thickening.  Severe left and moderate right neural foraminal stenosis.  Mild-to-moderate spinal canal and lateral recess stenosis.  L5-S1: Right asymmetric diffuse disc bulge.  Moderate bilateral facet arthropathy and ligamentum flavum  thickening.  Mild bilateral neural foraminal stenosis.  No spinal canal stenosis.     Paraspinal muscles & soft tissues: Fatty atrophy of the paraspinal musculature.    Assessment:   Problem List Items Addressed This Visit    None  Visit Diagnoses       Lumbar radiculopathy    -  Primary    Spondylolisthesis of lumbar region                  Miki Faustin is a 72 y.o. male patient who has a past medical history of Anxiety and depression, Aortic valve sclerosis, Atherosclerosis of arteries, Bronchiectasis, Diabetes mellitus, Environmental and seasonal allergies, Gall stones, Hypertension, Insomnia, Mitral valve sclerosis, Mixed hyperlipidemia, and Pulmonary nodules. He presents with back pain.  He reports his pain started about 3 months ago when he sat down to hard on a steel chair.  At that time he was having severe pain radiating down into his right thigh and right calf.  He reports that over the past week he is had a gradual improvement in his pain.  Today he rates his pain as 1-2/10, intermittent, sharp, tight.  He can occasionally still get severe pain flares to 7/10 and they are unpredictable.  He denies any numbness or weakness.      7/25/23 - Mr. Faustin presents to the office for follow up.  Today he reports near complete resolution of his pain.  He says on occasion and brief intermittent basis he will have some transient right leg pain but overall he is found a significant improvement in his back and leg pain.  At this time I have recommended he maintain PT and PT directed home exercises as it has provided him significant improvement in his quality of life.  He reports that he was able play pool volleyball with his grand kids without any pain.   Follow up as needed.           : Not applicable    Juan Diego Hernández M.D.  Interventional Pain Medicine / Anesthesiology    This note was completed with dictation software and grammatical errors may exist.

## 2023-07-28 ENCOUNTER — OFFICE VISIT (OUTPATIENT)
Dept: DERMATOLOGY | Facility: CLINIC | Age: 73
End: 2023-07-28
Payer: MEDICARE

## 2023-07-28 DIAGNOSIS — Z48.02 VISIT FOR SUTURE REMOVAL: Primary | ICD-10-CM

## 2023-07-28 PROCEDURE — 99211 OFF/OP EST MAY X REQ PHY/QHP: CPT | Mod: PBBFAC,PO | Performed by: DERMATOLOGY

## 2023-07-28 PROCEDURE — 99999 PR PBB SHADOW E&M-EST. PATIENT-LVL I: ICD-10-PCS | Mod: PBBFAC,,, | Performed by: DERMATOLOGY

## 2023-07-28 PROCEDURE — 99024 POSTOP FOLLOW-UP VISIT: CPT | Mod: POP,,, | Performed by: DERMATOLOGY

## 2023-07-28 PROCEDURE — 99999 PR PBB SHADOW E&M-EST. PATIENT-LVL I: CPT | Mod: PBBFAC,,, | Performed by: DERMATOLOGY

## 2023-07-28 PROCEDURE — 99024 PR POST-OP FOLLOW-UP VISIT: ICD-10-PCS | Mod: POP,,, | Performed by: DERMATOLOGY

## 2023-07-28 NOTE — PROGRESS NOTES
Mohs Surgery Suture Removal Note   Patient Name: Miki Faustin  Patient : 1950  Date of Service: 2023    CC: Pt. Presents for removal of sutures on the left inferior preauricular today  Subjective: patient reports wound healing as expected     Objective: Wound well healed, sutures clean/dry/intact. No evidence of any complications noted.     Assessment and Plan:  Diagnoses and all orders for this visit:    Visit for suture removal      - Suture removal today  - Steri strips/mastisol were not applied  - Patient counseled on silicone gel/silicone sheeting and their use in the management of post-operative scars  - Handout on silicone gel/silicone gel sheeting was provided  - Patient to follow up with their referring provider in 3 months for further skin evaluation    Reg Arriaza

## 2023-08-07 ENCOUNTER — CLINICAL SUPPORT (OUTPATIENT)
Dept: REHABILITATION | Facility: HOSPITAL | Age: 73
End: 2023-08-07
Payer: MEDICARE

## 2023-08-07 DIAGNOSIS — M53.86 DECREASED ROM OF LUMBAR SPINE: ICD-10-CM

## 2023-08-07 DIAGNOSIS — G89.29 CHRONIC BILATERAL LOW BACK PAIN WITH RIGHT-SIDED SCIATICA: Primary | ICD-10-CM

## 2023-08-07 DIAGNOSIS — M54.41 CHRONIC BILATERAL LOW BACK PAIN WITH RIGHT-SIDED SCIATICA: Primary | ICD-10-CM

## 2023-08-07 PROCEDURE — 97112 NEUROMUSCULAR REEDUCATION: CPT | Mod: PO | Performed by: PHYSICAL THERAPIST

## 2023-08-07 PROCEDURE — 97110 THERAPEUTIC EXERCISES: CPT | Mod: CH,PO | Performed by: PHYSICAL THERAPIST

## 2023-08-07 NOTE — PROGRESS NOTES
"[facility]   Physical Therapy Treatment Note     Name: Miki Faustin  Clinic Number: [id number]    Therapy Diagnosis:   Encounter Diagnoses   Name Primary?    Chronic bilateral low back pain with right-sided sciatica Yes    Decreased ROM of lumbar spine      Physician: Junior Maddox*    Visit Date: 8/7/2023  Physician Orders: PT Eval and Treat   Medical Diagnosis from Referral:   Other idiopathic scoliosis, thoracolumbar region   DDD (degenerative disc disease), lumbosacral   Hip arthritis   Evaluation Date: 6/27/2023  Authorization Period Expiration: 05/15/2024  Plan of Care Expiration: 08/25/2023  Progress Note Due: 07/28/2023  Visit # / Visits authorized: 4/20   FOTO: 1/3    PTA Visit #: 3/5     Precautions: Standard, Diabetes, and blood thinners     Time In: 0845  Time Out: 0945  Total Billable Time: 38 minutes    SUBJECTIVE     Pt reports: being sore after working in the house and moving as well. No radicular pain. Right hip pain. He was compliant with home exercise program.  Response to previous treatment: improvements in pain  Functional change: too soon to determine      Pain: 0/10  Location: right lower back and LE       OBJECTIVE     Objective Measures updated at progress report unless specified.     Treatment     Miki received the treatments listed below:      therapeutic exercises to develop strength, endurance, ROM, flexibility, posture, and core stabilization for 30 minutes including:  Recumbent bike x 10 minutes  Standing: GSS on slant board x 30"  Prone on elbows x 3 minutes  LTR x 2 minutes  Supine piriformis stretch x 30 sec x 3 (B)   S/L clamshell (green TB) 2x10  Extension in standing 2x10, 5 sec hold   Seated lumbar extension 2x10, 70#     neuromuscular re-education activities to improve: Balance, Coordination, Proprioception, and Posture for 8 minutes. The following activities were included:  Supine gluteal sets x 2 minutes  Prone LE extension x 10 "   Posterior pelvic tilt 2x10 (heavy tactile and visual cueing)  PPT + bridge with green TB 2x10  Standing isometric crunch (ball push down) x 2 minutes   Standing shoulder extensions + TA (blue TB) 2x10    Manual therapy for 5 minutes: STM to (R) QL, distal IT band    Patient Education and Home Exercises     Home Exercises Provided and Patient Education Provided     Education provided:   - HEP compliance     Written Home Exercises Provided: Patient instructed to cont prior HEP. Exercises were reviewed and Miki was able to demonstrate them prior to the end of the session.  Miki demonstrated good  understanding of the education provided. See EMR under Patient Instructions for exercises provided during therapy sessions    ASSESSMENT     Miki required cueing and tactile input on core, pelvic tilts noted. Patient attempted with extension in lying with decreased end-range lumbar motion. No adverse effects.    Miki Is progressing well towards his goals.  Pt prognosis is Good.     Pt will continue to benefit from skilled outpatient physical therapy to address the deficits listed in the problem list box on initial evaluation, provide pt/family education and to maximize pt's level of independence in the home and community environment.   Pt's spiritual, cultural and educational needs considered and pt agreeable to plan of care and goals.     Anticipated barriers to physical therapy: none    Goals:   Short Term Goals: 4 weeks   Improve lumbar motion by 25% for mobility purposes. Ongoing  Improve hip/core MMT 1/2 grade for ambulatory purposes. Ongoing  Report > 25% reduction in low back painful episodes with home management. Met  Report centralized pain with repeated movements > 50% of the day for mobility purposes.     Long Term Goals: 8 weeks   Be independent with HEP with no limitations.  Demonstrate hip/core > 4/5 for ambulatory purposes.  Report > 50% reduction in low back painful  episodes with centralization  noted.  Demonstrate flexion in standing x 5 with no painful limitations.    PLAN     Continue current POC with emphasis on decreasing pain and improving core and hip stabilization.     Rico Rodriguez, PT

## 2023-08-16 ENCOUNTER — CLINICAL SUPPORT (OUTPATIENT)
Dept: REHABILITATION | Facility: HOSPITAL | Age: 73
End: 2023-08-16
Payer: MEDICARE

## 2023-08-16 DIAGNOSIS — G89.29 CHRONIC BILATERAL LOW BACK PAIN WITH RIGHT-SIDED SCIATICA: Primary | ICD-10-CM

## 2023-08-16 DIAGNOSIS — M54.41 CHRONIC BILATERAL LOW BACK PAIN WITH RIGHT-SIDED SCIATICA: Primary | ICD-10-CM

## 2023-08-16 DIAGNOSIS — M53.86 DECREASED ROM OF LUMBAR SPINE: ICD-10-CM

## 2023-08-16 PROCEDURE — 97110 THERAPEUTIC EXERCISES: CPT | Mod: PO,CQ

## 2023-08-16 PROCEDURE — 97112 NEUROMUSCULAR REEDUCATION: CPT | Mod: PO,CQ

## 2023-08-16 NOTE — PROGRESS NOTES
"[facility]   Physical Therapy Treatment Note     Name: Miki Faustin  Clinic Number: [id number]    Therapy Diagnosis:   Encounter Diagnoses   Name Primary?    Chronic bilateral low back pain with right-sided sciatica Yes    Decreased ROM of lumbar spine      Physician: Junior Maddox*    Visit Date: 8/16/2023  Physician Orders: PT Eval and Treat   Medical Diagnosis from Referral:   Other idiopathic scoliosis, thoracolumbar region   DDD (degenerative disc disease), lumbosacral   Hip arthritis   Evaluation Date: 6/27/2023  Authorization Period Expiration: 05/15/2024  Plan of Care Expiration: 08/25/2023  Progress Note Due: 07/28/2023  Visit # / Visits authorized: 5/20   FOTO: 1/3    PTA Visit #: 1/5     Precautions: Standard, Diabetes, and blood thinners     Time In: 0900 AM  Time Out: 0955 AM  Total Billable Time: 30 minutes    SUBJECTIVE     Pt reports: his wife recently had RAGHU so he has been doing much more around he house as well caring for her. Patient states he did have some pain into (R) calf last night but he states he was on his feet for several hours yesterday. He was compliant with home exercise program.  Response to previous treatment: improvements in pain  Functional change: too soon to determine      Pain: 0/10  Location: right lower back and LE       OBJECTIVE     Objective Measures updated at progress report unless specified.     Treatment     Miki received the treatments listed below:      therapeutic exercises to develop strength, endurance, ROM, flexibility, posture, and core stabilization for 30 minutes including:  Recumbent bike x 10 minutes  Standing: GSS on slant board x 30"  Prone on elbows x 3 minutes  LTR x 2 minutes  Supine piriformis stretch x 30 sec x 3 (B)   S/L clamshell (green TB) 2x10  Extension in standing 2x10, 5 sec hold   Seated lumbar extension 2x10, 70#     neuromuscular re-education activities to improve: Balance, Coordination, Proprioception, " and Posture for 8 minutes. The following activities were included:  Supine gluteal sets x 2 minutes  Prone LE extension x 10   Posterior pelvic tilt 2x10 (heavy tactile and visual cueing)  PPT + bridge with green TB 2x10  Standing isometric crunch (ball push down) x 2 minutes   Standing shoulder extensions + TA (blue TB) 2x10    Manual therapy for 00 minutes: STM to (R) QL, distal IT band    Patient Education and Home Exercises     Home Exercises Provided and Patient Education Provided     Education provided:   - HEP compliance     Written Home Exercises Provided: Patient instructed to cont prior HEP. Exercises were reviewed and Miki was able to demonstrate them prior to the end of the session.  Miki demonstrated good  understanding of the education provided. See EMR under Patient Instructions for exercises provided during therapy sessions    ASSESSMENT     Miki demonstrating improved control of pelvic tilts but he does require cueing to correct pelvic elevation. Patient continues to respond well to skilled intervention with complete resolution of pain achieved by end of treatment session.     Miki Is progressing well towards his goals.  Pt prognosis is Good.     Pt will continue to benefit from skilled outpatient physical therapy to address the deficits listed in the problem list box on initial evaluation, provide pt/family education and to maximize pt's level of independence in the home and community environment.   Pt's spiritual, cultural and educational needs considered and pt agreeable to plan of care and goals.     Anticipated barriers to physical therapy: none    Goals:   Short Term Goals: 4 weeks   Improve lumbar motion by 25% for mobility purposes. Ongoing  Improve hip/core MMT 1/2 grade for ambulatory purposes. Ongoing  Report > 25% reduction in low back painful episodes with home management. Met  Report centralized pain with repeated movements > 50% of the day for mobility purposes.     Long Term  Goals: 8 weeks   Be independent with HEP with no limitations.  Demonstrate hip/core > 4/5 for ambulatory purposes.  Report > 50% reduction in low back painful  episodes with centralization noted.  Demonstrate flexion in standing x 5 with no painful limitations.    PLAN     Continue current POC with emphasis on decreasing pain and improving core and hip stabilization.     Bina Porter, PTA

## 2023-09-05 DIAGNOSIS — N32.81 OAB (OVERACTIVE BLADDER): ICD-10-CM

## 2023-09-05 DIAGNOSIS — R32 URINARY INCONTINENCE, UNSPECIFIED TYPE: ICD-10-CM

## 2023-09-05 RX ORDER — OXYBUTYNIN CHLORIDE 5 MG/1
TABLET, EXTENDED RELEASE ORAL
Qty: 30 TABLET | Refills: 0 | OUTPATIENT
Start: 2023-09-05

## 2023-09-05 NOTE — TELEPHONE ENCOUNTER
Refill Decision Note   Miki Ramin  is requesting a refill authorization.  Brief Assessment and Rationale for Refill:  Quick Discontinue     Medication Therapy Plan:  discontinued on 6/21/2023 by Linn Schultz NP for the following reason: Side effects.    Medication Reconciliation Completed: No   Comments:     No Care Gaps recommended.     Note composed:9:37 AM 09/05/2023

## 2023-09-05 NOTE — TELEPHONE ENCOUNTER
No care due was identified.  [facility] Embedded Care Due Messages. Reference number: [id number].   9/05/2023 9:17:11 AM CDT

## 2023-09-20 ENCOUNTER — OFFICE VISIT (OUTPATIENT)
Dept: UROLOGY | Facility: CLINIC | Age: 73
End: 2023-09-20
Payer: MEDICARE

## 2023-09-20 VITALS — WEIGHT: 187.38 LBS | HEIGHT: 66 IN | BODY MASS INDEX: 30.11 KG/M2

## 2023-09-20 DIAGNOSIS — N39.41 URGE INCONTINENCE: ICD-10-CM

## 2023-09-20 DIAGNOSIS — R39.15 URINARY URGENCY: Primary | ICD-10-CM

## 2023-09-20 DIAGNOSIS — R35.0 URINARY FREQUENCY: ICD-10-CM

## 2023-09-20 LAB
BILIRUBIN, UA POC OHS: NEGATIVE
BLOOD, UA POC OHS: NEGATIVE
CLARITY, UA POC OHS: CLEAR
COLOR, UA POC OHS: YELLOW
GLUCOSE, UA POC OHS: NEGATIVE
KETONES, UA POC OHS: NEGATIVE
LEUKOCYTES, UA POC OHS: NEGATIVE
NITRITE, UA POC OHS: NEGATIVE
PH, UA POC OHS: 5.5
PROTEIN, UA POC OHS: NEGATIVE
SPECIFIC GRAVITY, UA POC OHS: 1.01
UROBILINOGEN, UA POC OHS: 0.2

## 2023-09-20 PROCEDURE — 99213 OFFICE O/P EST LOW 20 MIN: CPT | Mod: PBBFAC,PO

## 2023-09-20 PROCEDURE — 81003 URINALYSIS AUTO W/O SCOPE: CPT | Mod: PBBFAC,PO

## 2023-09-20 PROCEDURE — 99999PBSHW PR PBB SHADOW TECHNICAL ONLY FILED TO HB: ICD-10-PCS | Mod: PBBFAC,,,

## 2023-09-20 PROCEDURE — 99214 PR OFFICE/OUTPT VISIT, EST, LEVL IV, 30-39 MIN: ICD-10-PCS | Mod: S$PBB,,,

## 2023-09-20 PROCEDURE — 99999 PR PBB SHADOW E&M-EST. PATIENT-LVL III: ICD-10-PCS | Mod: PBBFAC,,,

## 2023-09-20 PROCEDURE — 99999 PR PBB SHADOW E&M-EST. PATIENT-LVL III: CPT | Mod: PBBFAC,,,

## 2023-09-20 PROCEDURE — 51798 US URINE CAPACITY MEASURE: CPT | Mod: PBBFAC,PO

## 2023-09-20 PROCEDURE — 99999PBSHW POCT URINALYSIS(INSTRUMENT): Mod: PBBFAC,,,

## 2023-09-20 PROCEDURE — 99214 OFFICE O/P EST MOD 30 MIN: CPT | Mod: S$PBB,,,

## 2023-09-20 PROCEDURE — 99999PBSHW PR PBB SHADOW TECHNICAL ONLY FILED TO HB: Mod: PBBFAC,,,

## 2023-09-20 RX ORDER — OXYBUTYNIN CHLORIDE 5 MG/1
TABLET, EXTENDED RELEASE ORAL
COMMUNITY
Start: 2023-07-30 | End: 2023-11-14

## 2023-09-20 RX ORDER — OXYBUTYNIN CHLORIDE 5 MG/1
5 TABLET, EXTENDED RELEASE ORAL DAILY
Qty: 90 TABLET | Refills: 3 | Status: SHIPPED | OUTPATIENT
Start: 2023-09-20 | End: 2024-09-19

## 2023-09-20 NOTE — PROGRESS NOTES
[facility] Note  Staff: DORIAN Diamond    PCP: MD Tan    Chief Complaint: OAB Medication Follow Up    Subjective:        HPI: Miki Faustin is a 72 y.o. male presents today for medication follow up. At his last OV, he was started on oxybutynin 5mg XL by his PCP prior. He stated it was helping his symptoms but was causing dry mouth. We switched him to myrbetriq to avoid anticholinergic side effects. He states he never started this medication because the cost was too much. He states he would like to be prescribed oxybutynin and he can manage the side effects. He denies constipation. He denies dysuria, hematuria, flank pain, and difficulty urinating. He is due to update his PSA soon from PCP.     Questions asked the pt during ov today:  Urgency: Yes, urge incontinence? Yes  NTF: 1-2x night, DTF: every 2 hours  Dysuria: No  Gross Hematuria:No  Straining:No, Hesistancy:No, Intermittency:No}, Weak stream:No    Last PSA Screening:   Lab Results   Component Value Date    PSA 1.6 10/12/2022       History of Kidney Stones?:  No    Constipation issues?:  No    PVR by bladder scan performed by MA today:  99 mL    REVIEW OF SYSTEMS:  Review of Systems   Constitutional: Negative.  Negative for chills and fever.   HENT: Negative.     Eyes: Negative.    Respiratory: Negative.     Cardiovascular: Negative.    Gastrointestinal: Negative.  Negative for abdominal pain, constipation, diarrhea, nausea and vomiting.   Genitourinary:  Positive for frequency and urgency. Negative for dysuria, flank pain and hematuria.   Musculoskeletal:  Positive for back pain.   Skin: Negative.    Neurological: Negative.    Endo/Heme/Allergies: Negative.    Psychiatric/Behavioral: Negative.         PMHx:  Past Medical History:   Diagnosis Date    Anxiety and depression     Aortic valve sclerosis     Atherosclerosis of arteries     Bronchiectasis     Diabetes mellitus     Environmental and seasonal allergies     chronic  - taking shots    Gall stones     2021     Hypertension     Insomnia     Mitral valve sclerosis     Mixed hyperlipidemia     Pulmonary nodules     2021        PSHx:  Past Surgical History:   Procedure Laterality Date    COLONOSCOPY      MOUTH SURGERY      SINUS SURGERY      TONSILLECTOMY         Fam Hx:   malignancies: No    kidney stones: No     Soc Hx:  , lives in [place]    Allergies:  Patient has no known allergies.    Medications: reviewed     Objective:   There were no vitals filed for this visit.    Physical Exam  Constitutional:       Appearance: Normal appearance.   HENT:      Head: Normocephalic.      Mouth/Throat:      Mouth: Mucous membranes are moist.   Eyes:      Conjunctiva/sclera: Conjunctivae normal.   Pulmonary:      Effort: Pulmonary effort is normal.   Abdominal:      General: There is no distension.      Palpations: Abdomen is soft.      Tenderness: There is no abdominal tenderness. There is no right CVA tenderness or left CVA tenderness.   Musculoskeletal:         General: Normal range of motion.      Cervical back: Normal range of motion.   Skin:     General: Skin is warm.   Neurological:      Mental Status: He is alert and oriented to person, place, and time.   Psychiatric:         Mood and Affect: Mood normal.         Behavior: Behavior normal.           LABS REVIEW:  UA today:  Color:Clear, Yellow  Spec. Grav.  1.010  PH  5.5  Negative for leukocytes, nitrates, protein, glucose, ketones, urobili, bili, and blood.    Assessment:       1. Urinary urgency    2. Urinary frequency    3. Urge incontinence          Plan:     Oxybutynin 5mg XL prescribed to pt today as trial to see if med improves pt's current LUTS.  Benefits, risks and side affects were thoroughly explained to pt today in office with all questions answered.  Update PSA through PCP    F/u Annually or As Needed    [facility]: Active    DORIAN Diamond

## 2023-10-17 ENCOUNTER — PATIENT MESSAGE (OUTPATIENT)
Dept: FAMILY MEDICINE | Facility: CLINIC | Age: 73
End: 2023-10-17
Payer: MEDICARE

## 2023-11-14 ENCOUNTER — OFFICE VISIT (OUTPATIENT)
Dept: FAMILY MEDICINE | Facility: CLINIC | Age: 73
End: 2023-11-14
Payer: MEDICARE

## 2023-11-14 VITALS
HEART RATE: 95 BPM | DIASTOLIC BLOOD PRESSURE: 84 MMHG | OXYGEN SATURATION: 97 % | WEIGHT: 184.94 LBS | SYSTOLIC BLOOD PRESSURE: 138 MMHG | HEIGHT: 66 IN | BODY MASS INDEX: 29.72 KG/M2

## 2023-11-14 DIAGNOSIS — K21.9 LARYNGOPHARYNGEAL REFLUX (LPR): ICD-10-CM

## 2023-11-14 DIAGNOSIS — Z12.5 SCREENING PSA (PROSTATE SPECIFIC ANTIGEN): ICD-10-CM

## 2023-11-14 DIAGNOSIS — Z00.00 MEDICARE ANNUAL WELLNESS VISIT, SUBSEQUENT: ICD-10-CM

## 2023-11-14 DIAGNOSIS — G47.00 INSOMNIA, UNSPECIFIED TYPE: ICD-10-CM

## 2023-11-14 DIAGNOSIS — C44.320 SQUAMOUS CELL SKIN CANCER, FACE: Primary | ICD-10-CM

## 2023-11-14 PROCEDURE — 99214 PR OFFICE/OUTPT VISIT, EST, LEVL IV, 30-39 MIN: ICD-10-PCS | Mod: S$PBB,,, | Performed by: INTERNAL MEDICINE

## 2023-11-14 PROCEDURE — 99999 PR PBB SHADOW E&M-EST. PATIENT-LVL IV: ICD-10-PCS | Mod: PBBFAC,,, | Performed by: INTERNAL MEDICINE

## 2023-11-14 PROCEDURE — 99999 PR PBB SHADOW E&M-EST. PATIENT-LVL IV: CPT | Mod: PBBFAC,,, | Performed by: INTERNAL MEDICINE

## 2023-11-14 PROCEDURE — 99214 OFFICE O/P EST MOD 30 MIN: CPT | Mod: PBBFAC,PN | Performed by: INTERNAL MEDICINE

## 2023-11-14 PROCEDURE — 99214 OFFICE O/P EST MOD 30 MIN: CPT | Mod: S$PBB,,, | Performed by: INTERNAL MEDICINE

## 2023-11-14 RX ORDER — ESOMEPRAZOLE MAGNESIUM 40 MG/1
40 CAPSULE, DELAYED RELEASE ORAL
Qty: 90 CAPSULE | Refills: 3 | Status: SHIPPED | OUTPATIENT
Start: 2023-11-14 | End: 2023-12-14

## 2023-11-14 RX ORDER — ZOLPIDEM TARTRATE 10 MG/1
10 TABLET ORAL NIGHTLY
Qty: 90 TABLET | Refills: 0 | Status: SHIPPED | OUTPATIENT
Start: 2023-11-14

## 2023-11-14 NOTE — PROGRESS NOTES
Subjective:       Patient ID: Miki Faustin is a 72 y.o. male.    Chief Complaint: Follow-up   HPI    PSA: 1.6 (( 10/2022 ) urology   Colonoscopy:  6/2/14 r/c 5   Immunizations: Tdap: 4/6/17  Pneumovax: 2018  Prevnar 13: 2017  Shingles: 2019  Covid: yes   Smoker: never   Eye: Dr Gifford   Derm: K Jay + sq cell ca face 2023        Due for labs              HTN: controlled Rx Diltiazem 240, Lisinopril 40, Cardura 2.               Off Hctz due OAB     Cardic mumur: + Mod AV sclerosis, Mild MR sclerosis//  mild TR, 2021.  denies sob or cp /  /cardio Dr Rivera      Anxiety: controlled Rx Wellbutrin  twice daily     Type 2 DM:  A1c 6.0 G 120  // Rx regular Metformin 500 3 x daily.   Not checking sugars at home.    /highest fasting 143     Insomnia: controlled Rx Ambien 5-10 mg nightly      HLD:  LDL goal< 70//due for r.c //  Rx Lipitor 80 mg takes 1/2 pill        Atherosclerosis: + Aorta scattered calcific plaque, coronary artery calcifications.      BPH/overactive bladder:  seen urology started Rx Oxybutynin helpful. + dry mouth.   Hx urgency, incontinence at times, .      Pulmonary nodules/mild  Bronchiectasis BLL:  Stable CT.  Recommended no further exam is needed.    mgmt Pulm  Positive pulmonary nodules < 4 mm bilateral. CT 2021.      Chronic Allergies: mgmt Dr Pacheco  Rx Xyzal, Singular.  & 3 allergies shots weekly   Chronic cough: seen ENT +      GERD HH: controlled Rx Nexium 40   ___________________  ____________________________________________________________________________________________________  Assessment & Plan:  1. Squamous cell skin cancer, face    2. Screening PSA (prostate specific antigen)  - PSA, Screening; Future    3. Medicare annual wellness visit, subsequent  - PSA, Screening; Future    4. Insomnia, unspecified type  - zolpidem (AMBIEN) 10 mg Tab; Take 1 tablet (10 mg total) by mouth every evening. # 1  Dispense: 90 tablet; Refill: 0    5. Laryngopharyngeal reflux (LPR)  -  esomeprazole (NEXIUM) 40 MG capsule; Take 1 capsule (40 mg total) by mouth before breakfast.  Dispense: 90 capsule; Refill: 3     Squamous cell skin cancer, face  Comments:  2023 left under ear    Screening PSA (prostate specific antigen)  -     PSA, Screening; Future; Expected date: 11/14/2023    Medicare annual wellness visit, subsequent  -     PSA, Screening; Future; Expected date: 11/14/2023    Insomnia, unspecified type  -     zolpidem (AMBIEN) 10 mg Tab; Take 1 tablet (10 mg total) by mouth every evening. # 1  Dispense: 90 tablet; Refill: 0    Laryngopharyngeal reflux (LPR)  -     esomeprazole (NEXIUM) 40 MG capsule; Take 1 capsule (40 mg total) by mouth before breakfast.  Dispense: 90 capsule; Refill: 3        Continue to work on regular exercise, maintain healthy weight, balanced diet. Avoid unhealthy habits: smoking, excessive alcohol intake.     Disclaimer: This note was partly generated using dictation software which may occasionally result in transcription errors  ____________________________________________________________________________________________________  Review of Systems:  Review of Systems   Constitutional:  Negative for chills.   HENT:  Negative for drooling.    Eyes:  Negative for pain.   Respiratory:  Negative for choking.    Cardiovascular:  Negative for chest pain.   Gastrointestinal:  Negative for blood in stool.   Genitourinary:  Negative for hematuria.   Musculoskeletal:  Negative for joint swelling.   Skin:  Negative for pallor.   Neurological:  Negative for facial asymmetry.   Psychiatric/Behavioral:  Negative for confusion.        Objective:     Wt Readings from Last 3 Encounters:   11/14/23 83.9 kg (184 lb 15.5 oz)   09/20/23 85 kg (187 lb 6.4 oz)   07/25/23 87.1 kg (192 lb 2.1 oz)     BP Readings from Last 3 Encounters:   11/14/23 138/84   07/25/23 (!) 178/79   07/19/23 (!) 164/92       Lab Results   Component Value Date    WBC 8.11 08/23/2021    HGB 16.2 08/23/2021    HCT 49.1  "08/23/2021     08/23/2021     05/02/2023    K 4.5 05/02/2023     05/02/2023    ALT 21 05/02/2023    AST 20 05/02/2023    CO2 27 05/02/2023    CREATININE 1.2 05/02/2023    BUN 26 (H) 05/02/2023    PSA 1.6 10/12/2022     (H) 05/02/2023      Hemoglobin A1C   Date Value Ref Range Status   05/02/2023 6.0 (H) 4.0 - 5.6 % Final     Comment:     ADA Screening Guidelines:  5.7-6.4%  Consistent with prediabetes  >or=6.5%  Consistent with diabetes    High levels of fetal hemoglobin interfere with the HbA1C  assay. Heterozygous hemoglobin variants (HbS, HgC, etc)do  not significantly interfere with this assay.   However, presence of multiple variants may affect accuracy.     10/12/2022 6.0 (H) 4.0 - 5.6 % Final     Comment:     ADA Screening Guidelines:  5.7-6.4%  Consistent with prediabetes  >or=6.5%  Consistent with diabetes    High levels of fetal hemoglobin interfere with the HbA1C  assay. Heterozygous hemoglobin variants (HbS, HgC, etc)do  not significantly interfere with this assay.   However, presence of multiple variants may affect accuracy.     08/23/2021 6.0 (H) 4.0 - 5.6 % Final     Comment:     ADA Screening Guidelines:  5.7-6.4%  Consistent with prediabetes  >or=6.5%  Consistent with diabetes    High levels of fetal hemoglobin interfere with the HbA1C  assay. Heterozygous hemoglobin variants (HbS, HgC, etc)do  not significantly interfere with this assay.   However, presence of multiple variants may affect accuracy.        Lab Results   Component Value Date    TSH 1.544 08/23/2021     No results found for: "FREET4"  Lab Results   Component Value Date    LDLCALC 114.8 05/02/2023    LDLCALC 91.2 10/12/2022    LDLCALC 115.0 08/23/2021     Lab Results   Component Value Date    TRIG 106 05/02/2023    TRIG 74 10/12/2022    TRIG 145 08/23/2021            Physical Exam  Constitutional:       Appearance: Normal appearance.   HENT:      Head: Normocephalic and atraumatic.   Eyes:      Extraocular " Movements: Extraocular movements intact.      Conjunctiva/sclera: Conjunctivae normal.      Pupils: Pupils are equal, round, and reactive to light.   Cardiovascular:      Rate and Rhythm: Normal rate and regular rhythm.      Heart sounds: Murmur heard.   Pulmonary:      Effort: Pulmonary effort is normal.      Breath sounds: Normal breath sounds.   Musculoskeletal:      Right lower leg: No edema.      Left lower leg: No edema.   Neurological:      Mental Status: He is alert and oriented to person, place, and time.   Psychiatric:         Mood and Affect: Mood normal.         Medication List with Changes/Refills   Current Medications    ASPIRIN (ECOTRIN) 81 MG EC TABLET    Take 81 mg by mouth once daily.    ATORVASTATIN (LIPITOR) 80 MG TABLET    Take 1 tablet (80 mg total) by mouth once daily.    BUPROPION (WELLBUTRIN SR) 150 MG TBSR 12 HR TABLET    Take 1 tablet (150 mg total) by mouth 2 (two) times daily.    DILTIAZEM (DILT-XR) 240 MG CDCR    Take 1 capsule (240 mg total) by mouth once daily.    DOXAZOSIN (CARDURA) 2 MG TABLET    Take 1 tablet (2 mg total) by mouth once daily. BP and bladder    LEVOCETIRIZINE (XYZAL) 5 MG TABLET    Take 5 mg by mouth every evening.    LISINOPRIL (PRINIVIL,ZESTRIL) 40 MG TABLET    Take 1 tablet (40 mg total) by mouth once daily.    METFORMIN (GLUCOPHAGE) 500 MG TABLET    Take 1 tablet (500 mg total) by mouth 3 (three) times daily.    MONTELUKAST (SINGULAIR) 10 MG TABLET    Take 1 tablet (10 mg total) by mouth once daily.    OXYBUTYNIN (DITROPAN-XL) 5 MG TR24    Take 1 tablet (5 mg total) by mouth once daily.   Changed and/or Refilled Medications    Modified Medication Previous Medication    ESOMEPRAZOLE (NEXIUM) 40 MG CAPSULE esomeprazole (NEXIUM) 40 MG capsule       Take 1 capsule (40 mg total) by mouth before breakfast.    Take 1 capsule (40 mg total) by mouth before breakfast.    ZOLPIDEM (AMBIEN) 10 MG TAB zolpidem (AMBIEN) 10 mg Tab       Take 1 tablet (10 mg total) by mouth  every evening. # 1    Take 1 tablet (10 mg total) by mouth every evening.   Discontinued Medications    OXYBUTYNIN (DITROPAN-XL) 5 MG TR24        TIZANIDINE (ZANAFLEX) 4 MG TABLET    Take 1 tablet (4 mg total) by mouth nightly as needed (muscle relaxer).

## 2023-11-17 ENCOUNTER — LAB VISIT (OUTPATIENT)
Dept: LAB | Facility: HOSPITAL | Age: 73
End: 2023-11-17
Attending: INTERNAL MEDICINE
Payer: MEDICARE

## 2023-11-17 DIAGNOSIS — E78.2 MIXED HYPERLIPIDEMIA: ICD-10-CM

## 2023-11-17 DIAGNOSIS — E11.9 TYPE 2 DIABETES MELLITUS WITHOUT COMPLICATION, WITHOUT LONG-TERM CURRENT USE OF INSULIN: ICD-10-CM

## 2023-11-17 DIAGNOSIS — R79.89 ABNORMAL CBC: ICD-10-CM

## 2023-11-17 DIAGNOSIS — Z12.5 SCREENING PSA (PROSTATE SPECIFIC ANTIGEN): ICD-10-CM

## 2023-11-17 DIAGNOSIS — Z00.00 MEDICARE ANNUAL WELLNESS VISIT, SUBSEQUENT: ICD-10-CM

## 2023-11-17 DIAGNOSIS — I10 HYPERTENSION, UNSPECIFIED TYPE: ICD-10-CM

## 2023-11-17 LAB
ALBUMIN SERPL BCP-MCNC: 4.3 G/DL (ref 3.5–5.2)
ALP SERPL-CCNC: 70 U/L (ref 55–135)
ALT SERPL W/O P-5'-P-CCNC: 15 U/L (ref 10–44)
ANION GAP SERPL CALC-SCNC: 13 MMOL/L (ref 8–16)
AST SERPL-CCNC: 15 U/L (ref 10–40)
BILIRUB SERPL-MCNC: 0.4 MG/DL (ref 0.1–1)
BUN SERPL-MCNC: 23 MG/DL (ref 8–23)
CALCIUM SERPL-MCNC: 10.2 MG/DL (ref 8.7–10.5)
CHLORIDE SERPL-SCNC: 105 MMOL/L (ref 95–110)
CHOLEST SERPL-MCNC: 161 MG/DL (ref 120–199)
CHOLEST/HDLC SERPL: 3.9 {RATIO} (ref 2–5)
CO2 SERPL-SCNC: 22 MMOL/L (ref 23–29)
COMPLEXED PSA SERPL-MCNC: 1.6 NG/ML (ref 0–4)
CREAT SERPL-MCNC: 1.2 MG/DL (ref 0.5–1.4)
ERYTHROCYTE [DISTWIDTH] IN BLOOD BY AUTOMATED COUNT: 13.6 % (ref 11.5–14.5)
EST. GFR  (NO RACE VARIABLE): >60 ML/MIN/1.73 M^2
ESTIMATED AVG GLUCOSE: 117 MG/DL (ref 68–131)
GLUCOSE SERPL-MCNC: 111 MG/DL (ref 70–110)
HBA1C MFR BLD: 5.7 % (ref 4–5.6)
HCT VFR BLD AUTO: 48.1 % (ref 40–54)
HDLC SERPL-MCNC: 41 MG/DL (ref 40–75)
HDLC SERPL: 25.5 % (ref 20–50)
HGB BLD-MCNC: 15.5 G/DL (ref 14–18)
LDLC SERPL CALC-MCNC: 99.4 MG/DL (ref 63–159)
MCH RBC QN AUTO: 27.9 PG (ref 27–31)
MCHC RBC AUTO-ENTMCNC: 32.2 G/DL (ref 32–36)
MCV RBC AUTO: 87 FL (ref 82–98)
NONHDLC SERPL-MCNC: 120 MG/DL
PLATELET # BLD AUTO: 320 K/UL (ref 150–450)
PMV BLD AUTO: 10.7 FL (ref 9.2–12.9)
POTASSIUM SERPL-SCNC: 4.5 MMOL/L (ref 3.5–5.1)
PROT SERPL-MCNC: 7.6 G/DL (ref 6–8.4)
RBC # BLD AUTO: 5.55 M/UL (ref 4.6–6.2)
SODIUM SERPL-SCNC: 140 MMOL/L (ref 136–145)
TRIGL SERPL-MCNC: 103 MG/DL (ref 30–150)
TSH SERPL DL<=0.005 MIU/L-ACNC: 1.72 UIU/ML (ref 0.4–4)
WBC # BLD AUTO: 5.89 K/UL (ref 3.9–12.7)

## 2023-11-17 PROCEDURE — 80053 COMPREHEN METABOLIC PANEL: CPT | Performed by: INTERNAL MEDICINE

## 2023-11-17 PROCEDURE — 83036 HEMOGLOBIN GLYCOSYLATED A1C: CPT | Performed by: INTERNAL MEDICINE

## 2023-11-17 PROCEDURE — 85027 COMPLETE CBC AUTOMATED: CPT | Performed by: INTERNAL MEDICINE

## 2023-11-17 PROCEDURE — 84153 ASSAY OF PSA TOTAL: CPT | Performed by: INTERNAL MEDICINE

## 2023-11-17 PROCEDURE — 80061 LIPID PANEL: CPT | Performed by: INTERNAL MEDICINE

## 2023-11-17 PROCEDURE — 84443 ASSAY THYROID STIM HORMONE: CPT | Performed by: INTERNAL MEDICINE

## 2023-11-17 PROCEDURE — 36415 COLL VENOUS BLD VENIPUNCTURE: CPT | Mod: PN | Performed by: INTERNAL MEDICINE

## 2023-11-30 DIAGNOSIS — E78.2 MIXED HYPERLIPIDEMIA: ICD-10-CM

## 2023-11-30 DIAGNOSIS — R73.02 GLUCOSE INTOLERANCE (IMPAIRED GLUCOSE TOLERANCE): ICD-10-CM

## 2023-11-30 DIAGNOSIS — Z00.00 MEDICARE ANNUAL WELLNESS VISIT, SUBSEQUENT: ICD-10-CM

## 2023-11-30 DIAGNOSIS — E11.9 TYPE 2 DIABETES MELLITUS WITHOUT COMPLICATION, WITHOUT LONG-TERM CURRENT USE OF INSULIN: Primary | ICD-10-CM

## 2024-05-18 DIAGNOSIS — I10 ESSENTIAL HYPERTENSION: ICD-10-CM

## 2024-05-18 NOTE — TELEPHONE ENCOUNTER
No care due was identified.  [facility] Embedded Care Due Messages. Reference number: [id number].   5/18/2024 6:41:24 AM CDT

## 2024-05-19 RX ORDER — DILTIAZEM HYDROCHLORIDE 240 MG/1
240 CAPSULE, EXTENDED RELEASE ORAL
Qty: 90 CAPSULE | Refills: 1 | Status: SHIPPED | OUTPATIENT
Start: 2024-05-19 | End: 2024-05-21 | Stop reason: SDUPTHER

## 2024-05-19 NOTE — TELEPHONE ENCOUNTER
Refill Decision Note   Miki Faustin  is requesting a refill authorization.  Brief Assessment and Rationale for Refill:  Approve     Medication Therapy Plan:         Comments:     Note composed:6:35 PM 05/19/2024

## 2024-05-21 ENCOUNTER — OFFICE VISIT (OUTPATIENT)
Dept: FAMILY MEDICINE | Facility: CLINIC | Age: 74
End: 2024-05-21
Payer: MEDICARE

## 2024-05-21 VITALS
SYSTOLIC BLOOD PRESSURE: 176 MMHG | HEART RATE: 76 BPM | BODY MASS INDEX: 30.56 KG/M2 | RESPIRATION RATE: 18 BRPM | HEIGHT: 66 IN | DIASTOLIC BLOOD PRESSURE: 100 MMHG | WEIGHT: 190.13 LBS

## 2024-05-21 DIAGNOSIS — I10 ESSENTIAL HYPERTENSION: ICD-10-CM

## 2024-05-21 DIAGNOSIS — R73.02 GLUCOSE INTOLERANCE (IMPAIRED GLUCOSE TOLERANCE): ICD-10-CM

## 2024-05-21 DIAGNOSIS — I70.0 AORTIC ATHEROSCLEROSIS: ICD-10-CM

## 2024-05-21 DIAGNOSIS — K21.9 LARYNGOPHARYNGEAL REFLUX (LPR): ICD-10-CM

## 2024-05-21 DIAGNOSIS — E78.2 MIXED HYPERLIPIDEMIA: ICD-10-CM

## 2024-05-21 DIAGNOSIS — F41.9 ANXIETY: ICD-10-CM

## 2024-05-21 DIAGNOSIS — E11.9 TYPE 2 DIABETES MELLITUS WITHOUT COMPLICATION, WITHOUT LONG-TERM CURRENT USE OF INSULIN: ICD-10-CM

## 2024-05-21 DIAGNOSIS — G47.00 INSOMNIA, UNSPECIFIED TYPE: ICD-10-CM

## 2024-05-21 DIAGNOSIS — Z00.00 MEDICARE ANNUAL WELLNESS VISIT, SUBSEQUENT: Primary | ICD-10-CM

## 2024-05-21 DIAGNOSIS — N52.9 ERECTILE DYSFUNCTION, UNSPECIFIED ERECTILE DYSFUNCTION TYPE: ICD-10-CM

## 2024-05-21 PROCEDURE — 99213 OFFICE O/P EST LOW 20 MIN: CPT | Mod: S$PBB,25,, | Performed by: INTERNAL MEDICINE

## 2024-05-21 PROCEDURE — G0439 PPPS, SUBSEQ VISIT: HCPCS | Mod: ,,, | Performed by: INTERNAL MEDICINE

## 2024-05-21 PROCEDURE — 99999 PR PBB SHADOW E&M-EST. PATIENT-LVL IV: CPT | Mod: PBBFAC,,, | Performed by: INTERNAL MEDICINE

## 2024-05-21 PROCEDURE — 99214 OFFICE O/P EST MOD 30 MIN: CPT | Mod: PBBFAC,PN | Performed by: INTERNAL MEDICINE

## 2024-05-21 RX ORDER — ESOMEPRAZOLE MAGNESIUM 40 MG/1
40 CAPSULE, DELAYED RELEASE ORAL
Qty: 90 CAPSULE | Refills: 3 | Status: SHIPPED | OUTPATIENT
Start: 2024-05-21 | End: 2024-06-20

## 2024-05-21 RX ORDER — LISINOPRIL 40 MG/1
40 TABLET ORAL NIGHTLY
Qty: 90 TABLET | Refills: 2 | Status: SHIPPED | OUTPATIENT
Start: 2024-05-21

## 2024-05-21 RX ORDER — DILTIAZEM HYDROCHLORIDE 240 MG/1
240 CAPSULE, EXTENDED RELEASE ORAL EVERY MORNING
Qty: 90 CAPSULE | Refills: 2 | Status: SHIPPED | OUTPATIENT
Start: 2024-05-21

## 2024-05-21 RX ORDER — ATORVASTATIN CALCIUM 80 MG/1
80 TABLET, FILM COATED ORAL DAILY
Qty: 90 TABLET | Refills: 2 | Status: SHIPPED | OUTPATIENT
Start: 2024-05-21

## 2024-05-21 RX ORDER — SILDENAFIL 25 MG/1
25-50 TABLET, FILM COATED ORAL DAILY PRN
Qty: 30 TABLET | Refills: 0 | Status: SHIPPED | OUTPATIENT
Start: 2024-05-21 | End: 2025-05-21

## 2024-05-21 RX ORDER — DOXAZOSIN 2 MG/1
2 TABLET ORAL DAILY
Qty: 90 TABLET | Refills: 2 | Status: SHIPPED | OUTPATIENT
Start: 2024-05-21 | End: 2025-05-21

## 2024-05-21 RX ORDER — ZOLPIDEM TARTRATE 10 MG/1
10 TABLET ORAL NIGHTLY
Qty: 90 TABLET | Refills: 0 | Status: SHIPPED | OUTPATIENT
Start: 2024-05-21

## 2024-05-21 RX ORDER — METFORMIN HYDROCHLORIDE 500 MG/1
500 TABLET ORAL 3 TIMES DAILY
Qty: 270 TABLET | Refills: 2 | Status: SHIPPED | OUTPATIENT
Start: 2024-05-21

## 2024-05-21 RX ORDER — BUPROPION HYDROCHLORIDE 150 MG/1
150 TABLET, EXTENDED RELEASE ORAL 2 TIMES DAILY
Qty: 180 TABLET | Refills: 3 | Status: SHIPPED | OUTPATIENT
Start: 2024-05-21

## 2024-05-21 NOTE — PATIENT INSTRUCTIONS
Counseling and Referral of Other Preventative  (Italic type indicates deductible and co-insurance are waived)    No orders of the defined types were placed in this encounter.     The following information is provided to all patients.  This information is to help you find resources for any of the problems found today that may be affecting your health:                Living healthy guide: www.[facility].[place].[facility]       Understanding Diabetes: www.diabetes.org       Eating healthy: www.cdc.gov/healthyweight      CDC home safety checklist: www.cdc.gov/steadi/patient.html      Agency on Aging: www.goea.[place].[facility]       Alcoholics anonymous (AA): www.aa.org      Physical Activity: www.blu.nih.gov/am7dcsy       Tobacco use: www.quitwithusla.org

## 2024-05-21 NOTE — PROGRESS NOTES
The following components were reviewed and updated:   Medical history, Family History, Social history,Allergies and Current Medications, Health Risk Assessment, Health Maintenance, Living Situation, Depression Screening.    HRA: patient feels overall is healthy.  Psychosocial and behavioral risks discussed.  BMI - 30   Weight loss discussed.   Diet - well balanced.   ADL: self sufficient in all  Instrumental ADL: patient is able to manage things like their medications and finances.    Memory or cognitive function - Patient has no issues with either   Ambulates normal. No recent falls.  Exercise - yard work   Depression screening is negative.  Hearing--no deficits.  Vision - glasses    Incontinence - none  Opiate Screen- Negative     Preventative health needs discussed and patient was given a printed list of what they have received and what they will need with in the next 5-10 years. Recommendations developed using the USPSTF age appropriate recommendations. Education, counseling, and referrals were provided as needed.   Screening schedule reviewed with patient     Advanced Care directive: has will, I recommend living will & POA   (Ie: pick a person who would make decisions for you if you were unable to make them for yourself, called a health care power of , and what kind of decisions you might make such as use of life sustaining treatments such as ventilators, tube feeding when faced with a life limiting illness recorded on a living will.     I have reviewed and updated the patient's current list of providers.     In addition to the patient's preventative review and discussion today, the patient also has other issues to discuss today with a separate summary of plan below:     Subjective:       Patient ID: Miki Faustin is a 73 y.o. male.    Chief Complaint: Follow-up (6 month) and Medicare AWV   HPI    PSA: 1.6 (( 10/2022 ) urology   Colonoscopy:  6/2/14 r/c 5   Immunizations: Tdap: 4/6/17  Pneumovax: 2018   Prevnar 13: 2017  Shingles: 2019  Covid: yes   Smoker: never   Eye: Dr Gifford   Derm: K Jay + sq cell ca face 2023       wellness           182/100- ran out of Cardura last refill Sept   Contains of continue overactive bladder not improved with oxybutynin  5th digit left finger limited ROM due to arthritis      ED: having issues more recently.  Never tried medication     HTN: uncontrolled Rx Diltiazem 240, Lisinopril 40, ran out of Cardura 2.               Off Hctz due OAB     Cardic mumur:  Echo + Mod AV sclerosis, Mild MR sclerosis//  mild TR  2021 cardio Dr Rivera      Anxiety: controlled Rx Wellbutrin  twice daily     Type 2 DM:  A1c 6.0 G 120  // Rx regular Metformin 500 3 x daily.      highest fasting 143     Insomnia: controlled Rx Ambien 5-10 mg nightly      Mixed HLD:  LDL goal< 70//  Rx Lipitor 80 mg 1/2 pill        Atherosclerosis: + Aorta scattered calcific plaque, coronary artery calcifications.      BPH/overactive bladder:  seen urology started Rx Oxybutynin helpful. + dry mouth.   Hx urgency, incontinence at times, .      Pulmonary nodules/mild  Bronchiectasis BLL:  Stable CT.  Recommended no further exam is needed.    mgmt Pulm  Positive pulmonary nodules < 4 mm bilateral. CT 2021.      Chronic Allergies: mgmt Dr Pacheco  Rx Xyzal, 3 allergies shots weekly   Off Singular.   Chronic cough: mgmt  ENT     GERD HH: controlled Rx Nexium 40   ____________________________________________________________________________________________________  Assessment & Plan:  1. Medicare annual wellness visit, subsequent    2. Essential hypertension  - doxazosin (CARDURA) 2 MG tablet; Take 1 tablet (2 mg total) by mouth once daily. Blood pressure  Dispense: 90 tablet; Refill: 2  - Hypertension Digital Medicine (HDMP) Enrollment Order  - Hypertension Digital Medicine (HDMP) Enrollment Order    3. Type 2 diabetes mellitus without complication, without long-term current use of insulin    4. Erectile  dysfunction, unspecified erectile dysfunction type  - sildenafiL (VIAGRA) 25 MG tablet; Take 1-2 tablets (25-50 mg total) by mouth daily as needed for Erectile Dysfunction. (Dont take within 4 hrs of blood pressure medication)  Dispense: 30 tablet; Refill: 0    5. Aortic atherosclerosis     Medicare annual wellness visit, subsequent    Essential hypertension  Comments:  Return for blood pressure check, digital medicine  Orders:  -     doxazosin (CARDURA) 2 MG tablet; Take 1 tablet (2 mg total) by mouth once daily. Blood pressure  Dispense: 90 tablet; Refill: 2  -     Hypertension Digital Medicine (HDMP) Enrollment Order  -     Hypertension Digital Medicine (HDMP) Enrollment Order    Type 2 diabetes mellitus without complication, without long-term current use of insulin    Erectile dysfunction, unspecified erectile dysfunction type  -     sildenafiL (VIAGRA) 25 MG tablet; Take 1-2 tablets (25-50 mg total) by mouth daily as needed for Erectile Dysfunction. (Dont take within 4 hrs of blood pressure medication)  Dispense: 30 tablet; Refill: 0    Aortic atherosclerosis        Continue to work on regular exercise, maintain healthy weight, balanced diet. Avoid unhealthy habits: smoking, excessive alcohol intake.     Disclaimer: This note was partly generated using dictation software which may occasionally result in transcription errors  ____________________________________________________________________________________________________  Review of Systems:  Review of Systems   Constitutional:  Negative for chills.   HENT:  Negative for drooling.    Eyes:  Negative for pain.   Respiratory:  Negative for choking.    Cardiovascular:  Negative for chest pain.   Gastrointestinal:  Negative for blood in stool.   Genitourinary:  Negative for hematuria.   Musculoskeletal:  Negative for joint swelling.   Skin:  Negative for pallor.   Neurological:  Negative for facial asymmetry.   Psychiatric/Behavioral:  Negative for confusion.         Objective:     Wt Readings from Last 3 Encounters:   05/21/24 86.3 kg (190 lb 2.4 oz)   11/14/23 83.9 kg (184 lb 15.5 oz)   09/20/23 85 kg (187 lb 6.4 oz)     BP Readings from Last 3 Encounters:   05/21/24 (!) 176/100   11/14/23 138/84   07/25/23 (!) 178/79       Lab Results   Component Value Date    WBC 5.89 11/17/2023    HGB 15.5 11/17/2023    HCT 48.1 11/17/2023     11/17/2023     11/17/2023    K 4.5 11/17/2023     11/17/2023    ALT 15 11/17/2023    AST 15 11/17/2023    CO2 22 (L) 11/17/2023    CREATININE 1.2 11/17/2023    BUN 23 11/17/2023    PSA 1.6 11/17/2023     (H) 11/17/2023      Hemoglobin A1C   Date Value Ref Range Status   11/17/2023 5.7 (H) 4.0 - 5.6 % Final     Comment:     ADA Screening Guidelines:  5.7-6.4%  Consistent with prediabetes  >or=6.5%  Consistent with diabetes    High levels of fetal hemoglobin interfere with the HbA1C  assay. Heterozygous hemoglobin variants (HbS, HgC, etc)do  not significantly interfere with this assay.   However, presence of multiple variants may affect accuracy.     05/02/2023 6.0 (H) 4.0 - 5.6 % Final     Comment:     ADA Screening Guidelines:  5.7-6.4%  Consistent with prediabetes  >or=6.5%  Consistent with diabetes    High levels of fetal hemoglobin interfere with the HbA1C  assay. Heterozygous hemoglobin variants (HbS, HgC, etc)do  not significantly interfere with this assay.   However, presence of multiple variants may affect accuracy.     10/12/2022 6.0 (H) 4.0 - 5.6 % Final     Comment:     ADA Screening Guidelines:  5.7-6.4%  Consistent with prediabetes  >or=6.5%  Consistent with diabetes    High levels of fetal hemoglobin interfere with the HbA1C  assay. Heterozygous hemoglobin variants (HbS, HgC, etc)do  not significantly interfere with this assay.   However, presence of multiple variants may affect accuracy.        Lab Results   Component Value Date    TSH 1.716 11/17/2023    TSH 1.544 08/23/2021     No results found for:  ""FREET4"  Lab Results   Component Value Date    LDLCALC 99.4 11/17/2023    LDLCALC 114.8 05/02/2023    LDLCALC 91.2 10/12/2022     Lab Results   Component Value Date    TRIG 103 11/17/2023    TRIG 106 05/02/2023    TRIG 74 10/12/2022            Physical Exam  Constitutional:       Appearance: Normal appearance.   HENT:      Head: Normocephalic and atraumatic.   Eyes:      Extraocular Movements: Extraocular movements intact.      Conjunctiva/sclera: Conjunctivae normal.      Pupils: Pupils are equal, round, and reactive to light.   Cardiovascular:      Rate and Rhythm: Normal rate.      Heart sounds: Murmur heard.   Pulmonary:      Effort: Pulmonary effort is normal.   Neurological:      Mental Status: He is alert and oriented to person, place, and time.   Psychiatric:         Mood and Affect: Mood normal.         Medication List with Changes/Refills   New Medications    SILDENAFIL (VIAGRA) 25 MG TABLET    Take 1-2 tablets (25-50 mg total) by mouth daily as needed for Erectile Dysfunction. (Dont take within 4 hrs of blood pressure medication)   Current Medications    ASPIRIN (ECOTRIN) 81 MG EC TABLET    Take 81 mg by mouth once daily.    ATORVASTATIN (LIPITOR) 80 MG TABLET    Take 1 tablet (80 mg total) by mouth once daily.    BUPROPION (WELLBUTRIN SR) 150 MG TBSR 12 HR TABLET    Take 1 tablet (150 mg total) by mouth 2 (two) times daily.    DILT- MG CDCR    Take 1 capsule by mouth once daily    ESOMEPRAZOLE (NEXIUM) 40 MG CAPSULE    Take 1 capsule (40 mg total) by mouth before breakfast.    LEVOCETIRIZINE (XYZAL) 5 MG TABLET    Take 5 mg by mouth every evening.    LISINOPRIL (PRINIVIL,ZESTRIL) 40 MG TABLET    Take 1 tablet by mouth once daily    METFORMIN (GLUCOPHAGE) 500 MG TABLET    Take 1 tablet (500 mg total) by mouth 3 (three) times daily.    OXYBUTYNIN (DITROPAN-XL) 5 MG TR24    Take 1 tablet (5 mg total) by mouth once daily.    ZOLPIDEM (AMBIEN) 10 MG TAB    Take 1 tablet (10 mg total) by mouth every " evening. # 1   Changed and/or Refilled Medications    Modified Medication Previous Medication    DOXAZOSIN (CARDURA) 2 MG TABLET doxazosin (CARDURA) 2 MG tablet       Take 1 tablet (2 mg total) by mouth once daily. Blood pressure    Take 1 tablet (2 mg total) by mouth once daily. BP and bladder   Discontinued Medications    MONTELUKAST (SINGULAIR) 10 MG TABLET    Take 1 tablet (10 mg total) by mouth once daily.

## 2024-06-11 ENCOUNTER — LAB VISIT (OUTPATIENT)
Dept: LAB | Facility: HOSPITAL | Age: 74
End: 2024-06-11
Attending: INTERNAL MEDICINE
Payer: MEDICARE

## 2024-06-11 DIAGNOSIS — Z00.00 MEDICARE ANNUAL WELLNESS VISIT, SUBSEQUENT: ICD-10-CM

## 2024-06-11 DIAGNOSIS — E78.2 MIXED HYPERLIPIDEMIA: ICD-10-CM

## 2024-06-11 DIAGNOSIS — R73.02 GLUCOSE INTOLERANCE (IMPAIRED GLUCOSE TOLERANCE): ICD-10-CM

## 2024-06-11 LAB
ALBUMIN SERPL BCP-MCNC: 4.1 G/DL (ref 3.5–5.2)
ALP SERPL-CCNC: 65 U/L (ref 55–135)
ALT SERPL W/O P-5'-P-CCNC: 19 U/L (ref 10–44)
ANION GAP SERPL CALC-SCNC: 11 MMOL/L (ref 8–16)
AST SERPL-CCNC: 19 U/L (ref 10–40)
BILIRUB SERPL-MCNC: 0.4 MG/DL (ref 0.1–1)
BUN SERPL-MCNC: 26 MG/DL (ref 8–23)
CALCIUM SERPL-MCNC: 9.7 MG/DL (ref 8.7–10.5)
CHLORIDE SERPL-SCNC: 110 MMOL/L (ref 95–110)
CHOLEST SERPL-MCNC: 144 MG/DL (ref 120–199)
CHOLEST/HDLC SERPL: 4 {RATIO} (ref 2–5)
CO2 SERPL-SCNC: 20 MMOL/L (ref 23–29)
CREAT SERPL-MCNC: 1.3 MG/DL (ref 0.5–1.4)
EST. GFR  (NO RACE VARIABLE): 57.3 ML/MIN/1.73 M^2
ESTIMATED AVG GLUCOSE: 120 MG/DL (ref 68–131)
GLUCOSE SERPL-MCNC: 112 MG/DL (ref 70–110)
HBA1C MFR BLD: 5.8 % (ref 4–5.6)
HDLC SERPL-MCNC: 36 MG/DL (ref 40–75)
HDLC SERPL: 25 % (ref 20–50)
LDLC SERPL CALC-MCNC: 92.8 MG/DL (ref 63–159)
NONHDLC SERPL-MCNC: 108 MG/DL
POTASSIUM SERPL-SCNC: 4.1 MMOL/L (ref 3.5–5.1)
PROT SERPL-MCNC: 6.9 G/DL (ref 6–8.4)
SODIUM SERPL-SCNC: 141 MMOL/L (ref 136–145)
TRIGL SERPL-MCNC: 76 MG/DL (ref 30–150)

## 2024-06-11 PROCEDURE — 80061 LIPID PANEL: CPT | Performed by: INTERNAL MEDICINE

## 2024-06-11 PROCEDURE — 80053 COMPREHEN METABOLIC PANEL: CPT | Performed by: INTERNAL MEDICINE

## 2024-06-11 PROCEDURE — 83036 HEMOGLOBIN GLYCOSYLATED A1C: CPT | Performed by: INTERNAL MEDICINE

## 2024-06-11 PROCEDURE — 36415 COLL VENOUS BLD VENIPUNCTURE: CPT | Mod: PN | Performed by: INTERNAL MEDICINE

## 2024-06-24 DIAGNOSIS — E78.2 MIXED HYPERLIPIDEMIA: ICD-10-CM

## 2024-06-24 DIAGNOSIS — I10 ESSENTIAL HYPERTENSION: ICD-10-CM

## 2024-06-24 DIAGNOSIS — R73.02 GLUCOSE INTOLERANCE (IMPAIRED GLUCOSE TOLERANCE): ICD-10-CM

## 2024-06-24 DIAGNOSIS — N40.1 BENIGN PROSTATIC HYPERPLASIA WITH POST-VOID DRIBBLING: ICD-10-CM

## 2024-06-24 DIAGNOSIS — N39.43 BENIGN PROSTATIC HYPERPLASIA WITH POST-VOID DRIBBLING: ICD-10-CM

## 2024-06-24 DIAGNOSIS — R79.89 ABNORMAL CBC: ICD-10-CM

## 2024-06-24 DIAGNOSIS — Z00.00 MEDICARE ANNUAL WELLNESS VISIT, SUBSEQUENT: Primary | ICD-10-CM

## 2024-08-01 DIAGNOSIS — R73.02 GLUCOSE INTOLERANCE (IMPAIRED GLUCOSE TOLERANCE): ICD-10-CM

## 2024-08-02 RX ORDER — METFORMIN HYDROCHLORIDE 500 MG/1
500 TABLET ORAL 3 TIMES DAILY
Qty: 270 TABLET | Refills: 1 | Status: SHIPPED | OUTPATIENT
Start: 2024-08-02

## 2024-08-02 NOTE — TELEPHONE ENCOUNTER
Refill Decision Note   Miki Faustin  is requesting a refill authorization.  Brief Assessment and Rationale for Refill:  Approve     Medication Therapy Plan:         Comments:     Note composed:8:08 AM 08/02/2024

## 2024-08-02 NOTE — TELEPHONE ENCOUNTER
No care due was identified.  [facility] Embedded Care Due Messages. Reference number: [id number].   8/01/2024 10:28:30 PM CDT

## 2024-10-14 ENCOUNTER — LAB VISIT (OUTPATIENT)
Dept: LAB | Facility: HOSPITAL | Age: 74
End: 2024-10-14
Attending: INTERNAL MEDICINE
Payer: MEDICARE

## 2024-10-14 DIAGNOSIS — Z00.00 MEDICARE ANNUAL WELLNESS VISIT, SUBSEQUENT: ICD-10-CM

## 2024-10-14 DIAGNOSIS — R73.02 GLUCOSE INTOLERANCE (IMPAIRED GLUCOSE TOLERANCE): ICD-10-CM

## 2024-10-14 DIAGNOSIS — R79.89 ABNORMAL CBC: ICD-10-CM

## 2024-10-14 DIAGNOSIS — I10 ESSENTIAL HYPERTENSION: ICD-10-CM

## 2024-10-14 DIAGNOSIS — N40.1 BENIGN PROSTATIC HYPERPLASIA WITH POST-VOID DRIBBLING: ICD-10-CM

## 2024-10-14 DIAGNOSIS — E78.2 MIXED HYPERLIPIDEMIA: ICD-10-CM

## 2024-10-14 DIAGNOSIS — N39.43 BENIGN PROSTATIC HYPERPLASIA WITH POST-VOID DRIBBLING: ICD-10-CM

## 2024-10-14 LAB
ALBUMIN SERPL BCP-MCNC: 4.2 G/DL (ref 3.5–5.2)
ALP SERPL-CCNC: 77 U/L (ref 55–135)
ALT SERPL W/O P-5'-P-CCNC: 16 U/L (ref 10–44)
ANION GAP SERPL CALC-SCNC: 12 MMOL/L (ref 8–16)
AST SERPL-CCNC: 19 U/L (ref 10–40)
BILIRUB SERPL-MCNC: 0.4 MG/DL (ref 0.1–1)
BUN SERPL-MCNC: 23 MG/DL (ref 8–23)
CALCIUM SERPL-MCNC: 9.8 MG/DL (ref 8.7–10.5)
CHLORIDE SERPL-SCNC: 108 MMOL/L (ref 95–110)
CHOLEST SERPL-MCNC: 155 MG/DL (ref 120–199)
CHOLEST/HDLC SERPL: 4 {RATIO} (ref 2–5)
CO2 SERPL-SCNC: 20 MMOL/L (ref 23–29)
COMPLEXED PSA SERPL-MCNC: 1.5 NG/ML (ref 0–4)
CREAT SERPL-MCNC: 1 MG/DL (ref 0.5–1.4)
ERYTHROCYTE [DISTWIDTH] IN BLOOD BY AUTOMATED COUNT: 14.6 % (ref 11.5–14.5)
EST. GFR  (NO RACE VARIABLE): >60 ML/MIN/1.73 M^2
ESTIMATED AVG GLUCOSE: 123 MG/DL (ref 68–131)
GLUCOSE SERPL-MCNC: 109 MG/DL (ref 70–110)
HBA1C MFR BLD: 5.9 % (ref 4–5.6)
HCT VFR BLD AUTO: 44.4 % (ref 40–54)
HDLC SERPL-MCNC: 39 MG/DL (ref 40–75)
HDLC SERPL: 25.2 % (ref 20–50)
HGB BLD-MCNC: 14.8 G/DL (ref 14–18)
LDLC SERPL CALC-MCNC: 100.8 MG/DL (ref 63–159)
MCH RBC QN AUTO: 28 PG (ref 27–31)
MCHC RBC AUTO-ENTMCNC: 33.3 G/DL (ref 32–36)
MCV RBC AUTO: 84 FL (ref 82–98)
NONHDLC SERPL-MCNC: 116 MG/DL
PLATELET # BLD AUTO: 334 K/UL (ref 150–450)
PMV BLD AUTO: 10.3 FL (ref 9.2–12.9)
POTASSIUM SERPL-SCNC: 4 MMOL/L (ref 3.5–5.1)
PROT SERPL-MCNC: 7.2 G/DL (ref 6–8.4)
RBC # BLD AUTO: 5.29 M/UL (ref 4.6–6.2)
SODIUM SERPL-SCNC: 140 MMOL/L (ref 136–145)
TRIGL SERPL-MCNC: 76 MG/DL (ref 30–150)
TSH SERPL DL<=0.005 MIU/L-ACNC: 1.59 UIU/ML (ref 0.4–4)
WBC # BLD AUTO: 7.91 K/UL (ref 3.9–12.7)

## 2024-10-14 PROCEDURE — 84153 ASSAY OF PSA TOTAL: CPT | Performed by: INTERNAL MEDICINE

## 2024-10-14 PROCEDURE — 85027 COMPLETE CBC AUTOMATED: CPT | Performed by: INTERNAL MEDICINE

## 2024-10-14 PROCEDURE — 80053 COMPREHEN METABOLIC PANEL: CPT | Performed by: INTERNAL MEDICINE

## 2024-10-14 PROCEDURE — 80061 LIPID PANEL: CPT | Performed by: INTERNAL MEDICINE

## 2024-10-14 PROCEDURE — 36415 COLL VENOUS BLD VENIPUNCTURE: CPT | Mod: PN | Performed by: INTERNAL MEDICINE

## 2024-10-14 PROCEDURE — 83036 HEMOGLOBIN GLYCOSYLATED A1C: CPT | Performed by: INTERNAL MEDICINE

## 2024-10-14 PROCEDURE — 84443 ASSAY THYROID STIM HORMONE: CPT | Performed by: INTERNAL MEDICINE

## 2024-10-21 ENCOUNTER — OFFICE VISIT (OUTPATIENT)
Dept: FAMILY MEDICINE | Facility: CLINIC | Age: 74
End: 2024-10-21
Payer: MEDICARE

## 2024-10-21 DIAGNOSIS — I10 ESSENTIAL HYPERTENSION: ICD-10-CM

## 2024-10-21 DIAGNOSIS — I1A.0 RESISTANT HYPERTENSION: Primary | ICD-10-CM

## 2024-10-21 DIAGNOSIS — E78.2 DM TYPE 2 WITH DIABETIC MIXED HYPERLIPIDEMIA: ICD-10-CM

## 2024-10-21 DIAGNOSIS — Z00.00 MEDICARE ANNUAL WELLNESS VISIT, SUBSEQUENT: ICD-10-CM

## 2024-10-21 DIAGNOSIS — J47.9 BRONCHIECTASIS, UNCOMPLICATED: ICD-10-CM

## 2024-10-21 DIAGNOSIS — G47.00 INSOMNIA, UNSPECIFIED TYPE: ICD-10-CM

## 2024-10-21 DIAGNOSIS — N52.9 ERECTILE DYSFUNCTION, UNSPECIFIED ERECTILE DYSFUNCTION TYPE: ICD-10-CM

## 2024-10-21 DIAGNOSIS — I35.0 MODERATE AORTIC STENOSIS: ICD-10-CM

## 2024-10-21 DIAGNOSIS — E11.69 DM TYPE 2 WITH DIABETIC MIXED HYPERLIPIDEMIA: ICD-10-CM

## 2024-10-21 PROCEDURE — 99215 OFFICE O/P EST HI 40 MIN: CPT | Mod: PBBFAC,PN | Performed by: INTERNAL MEDICINE

## 2024-10-21 PROCEDURE — 99999 PR PBB SHADOW E&M-EST. PATIENT-LVL V: CPT | Mod: PBBFAC,,, | Performed by: INTERNAL MEDICINE

## 2024-10-21 PROCEDURE — 99214 OFFICE O/P EST MOD 30 MIN: CPT | Mod: S$PBB,,, | Performed by: INTERNAL MEDICINE

## 2024-10-21 RX ORDER — ZOLPIDEM TARTRATE 10 MG/1
10 TABLET ORAL NIGHTLY
Qty: 90 TABLET | Refills: 0 | Status: SHIPPED | OUTPATIENT
Start: 2024-10-21

## 2024-10-21 RX ORDER — SILDENAFIL 25 MG/1
25-50 TABLET, FILM COATED ORAL DAILY PRN
Qty: 30 TABLET | Refills: 1 | Status: SHIPPED | OUTPATIENT
Start: 2024-10-21 | End: 2025-10-21

## 2024-10-21 RX ORDER — VALSARTAN 320 MG/1
320 TABLET ORAL DAILY
Qty: 90 TABLET | Refills: 2 | Status: SHIPPED | OUTPATIENT
Start: 2024-10-21 | End: 2025-10-21

## 2024-10-21 RX ORDER — ATORVASTATIN CALCIUM 80 MG/1
80 TABLET, FILM COATED ORAL
COMMUNITY

## 2024-10-21 NOTE — PROGRESS NOTES
Subjective:       Patient ID: Miki Faustin is a 73 y.o. male.    Chief Complaint: Follow-up   HPI     History of Present Illness    CHIEF COMPLAINT:  Miki presents today for follow up.    VISION:  He reports experiencing blurry vision in both eyes.    MEDICATIONS:  He reports that the ED medication prescription was picked up from the pharmacy but has been misplaced. He suspects it may have been hidden to keep it away from grandchildren. He uses a pill organizer to manage medications, keeping 14 days of medications actively prepared. He religiously sets out his medications and recently reloaded the organizer. He mentions receiving medication check-ins from [employer] representatives, who call to ensure he is taking all prescribed medications as directed.    LAB RESULTS:  He reports normal PSA level of 1.5 Blood sugar was 109, and kidney function has normalized to 60 after previous dehydration. A1C has improved to 5.9 from a previous high of 6.0. Cholesterol panel shows LDL at 100, increased from 92 previously, while HDL is 39, approaching 40, which is noted as typical for him. Thyroid function is reported as normal. Complete blood count (CBC) indicates normal white blood cell count, no anemia, and normal platelet levels.     CARDIOVASCULAR:  He reports purchasing a blood pressure cuff for home monitoring but has not used it yet.    LIFESTYLE:  He reports engaging in various gardening activities, including starting a raised garden, growing small plants, and cultivating vegetables such as onions and spinach. He mentions working on lawn mowers, including disassembling them and replacing carburetors. He drinks water frequently.            PSA: 1.6 ((11/23 ) urology   Colonoscopy:  6/2/14 r/c 5   Immunizations: Tdap: 2017 Prevnar 13: 2017  Shingles: Y  Smoker: never   Eye: Dr Gifford   Derm: K [facility] + sq cell ca face 2023      F/u   146.88       HTN: uncontrolled Rx Diltiazem 240, Lisinopril 40, Cardura 2. - change  Lis to Valsartan 320               Off Hctz due OAB     Cardic mumur:  21' Echo + Mod AV sclerosis, Mild MR sclerosis//  mild TR  2021 cardio Dr Rivera past      Type 2 DM:  controlled A1c 5.9 G 109  // Rx regular Metformin 500 3 x daily.      highest fasting 143     Insomnia: controlled Rx Ambien 5-10 mg nightly      Mixed HLD:  LDL goal< 70//  Rx Lipitor 80 mg 1/2 pill      Atherosclerosis: + Aorta scattered calcific plaque, coronary artery calcifications.    Anxiety: controlled Rx Wellbutrin  bid       BPH/overactive bladder:  seen urology started Rx Oxybutynin helpful. + dry mouth.   Hx urgency, incontinence at times, .      Pulmonary nodules/mild  Bronchiectasis BLL:  Stable CT.  Recommended no further exam needed.    mgmt Pulm  Positive pulmonary nodules < 4 mm bilateral. CT 2021.      Chronic Allergies: mgmt Dr Pacheco  Rx Xyzal, 3 allergies shots weekly   Off Singular.   Chronic cough: mgmt  ENT     GERD HH: controlled Rx Nexium 40      ED: having issues more recently.  Hasn't tired med yet, lost it . Rx Viagra 25           ____________________________________________________________________________________________________  Assessment & Plan:  1. Resistant hypertension  - Hypertension Digital Medicine (HDMP) Enrollment Order  - CV US Renal Artery Stenosis Hypertension Complete; Future  - valsartan (DIOVAN) 320 MG tablet; Take 1 tablet (320 mg total) by mouth once daily.  Dispense: 90 tablet; Refill: 2    2. DM type 2 with diabetic mixed hyperlipidemia  - Comprehensive Metabolic Panel; Future  - Lipid Panel; Future  - Hemoglobin A1C; Future    3. Essential hypertension    4. Moderate aortic stenosis    5. Erectile dysfunction, unspecified erectile dysfunction type  - sildenafiL (VIAGRA) 25 MG tablet; Take 1-2 tablets (25-50 mg total) by mouth daily as needed for Erectile Dysfunction. (Dont take within 4 hrs of blood pressure medication)  Dispense: 30 tablet; Refill: 1    6. Insomnia, unspecified  type  - zolpidem (AMBIEN) 10 mg Tab; Take 1 tablet (10 mg total) by mouth every evening. # 1  Dispense: 90 tablet; Refill: 0    7. Medicare annual wellness visit, subsequent  - Comprehensive Metabolic Panel; Future  - Lipid Panel; Future  - Hemoglobin A1C; Future    8. Bronchiectasis, uncomplicated     Resistant hypertension  Comments:  get Us  Orders:  -     Hypertension Digital Medicine (HDMP) Enrollment Order  -     CV US Renal Artery Stenosis Hypertension Complete; Future  -     valsartan (DIOVAN) 320 MG tablet; Take 1 tablet (320 mg total) by mouth once daily.  Dispense: 90 tablet; Refill: 2    DM type 2 with diabetic mixed hyperlipidemia  -     Comprehensive Metabolic Panel; Future; Expected date: 10/21/2024  -     Lipid Panel; Future; Expected date: 10/21/2024  -     Hemoglobin A1C; Future; Expected date: 10/21/2024    Essential hypertension    Moderate aortic stenosis  Comments:  update echo next yr    Erectile dysfunction, unspecified erectile dysfunction type  -     sildenafiL (VIAGRA) 25 MG tablet; Take 1-2 tablets (25-50 mg total) by mouth daily as needed for Erectile Dysfunction. (Dont take within 4 hrs of blood pressure medication)  Dispense: 30 tablet; Refill: 1    Insomnia, unspecified type  -     zolpidem (AMBIEN) 10 mg Tab; Take 1 tablet (10 mg total) by mouth every evening. # 1  Dispense: 90 tablet; Refill: 0    Medicare annual wellness visit, subsequent  -     Comprehensive Metabolic Panel; Future; Expected date: 10/21/2024  -     Lipid Panel; Future; Expected date: 10/21/2024  -     Hemoglobin A1C; Future; Expected date: 10/21/2024    Bronchiectasis, uncomplicated        Continue to work on maintain healthy weight, balanced diet. Avoid unhealthy habits: smoking/vaping, excessive alcohol intake.     Recommend diet exercise:  High protein, low fat, low carb diet - calories women under <1200 & men <1800, carbs <100 G, protein 50-60 G daily. Protein supplements to replace one meal.  Keep all  beverages < 10 calories per serving. Keep snacks < 100 calories.   Recommend exercise 160 minutes per week, combo of cardio and weight/strength training.     Disclaimer: This note was partly generated using dictation software which may occasionally result in transcription errors  ____________________________________________________________________________________________________  Review of Systems:  Review of Systems      Negative     Objective:     Wt Readings from Last 3 Encounters:   10/21/24 83.1 kg (183 lb 3.2 oz)   05/21/24 86.3 kg (190 lb 2.4 oz)   11/14/23 83.9 kg (184 lb 15.5 oz)     BP Readings from Last 3 Encounters:   10/23/24 (!) 146/88   05/21/24 (!) 176/100   11/14/23 138/84       Lab Results   Component Value Date    WBC 7.91 10/14/2024    HGB 14.8 10/14/2024    HCT 44.4 10/14/2024     10/14/2024     10/14/2024    K 4.0 10/14/2024     10/14/2024    ALT 16 10/14/2024    AST 19 10/14/2024    CO2 20 (L) 10/14/2024    CREATININE 1.0 10/14/2024    BUN 23 10/14/2024    PSA 1.6 11/17/2023     10/14/2024      Hemoglobin A1C   Date Value Ref Range Status   10/14/2024 5.9 (H) 4.0 - 5.6 % Final     Comment:     ADA Screening Guidelines:  5.7-6.4%  Consistent with prediabetes  >or=6.5%  Consistent with diabetes    High levels of fetal hemoglobin interfere with the HbA1C  assay. Heterozygous hemoglobin variants (HbS, HgC, etc)do  not significantly interfere with this assay.   However, presence of multiple variants may affect accuracy.     06/11/2024 5.8 (H) 4.0 - 5.6 % Final     Comment:     ADA Screening Guidelines:  5.7-6.4%  Consistent with prediabetes  >or=6.5%  Consistent with diabetes    High levels of fetal hemoglobin interfere with the HbA1C  assay. Heterozygous hemoglobin variants (HbS, HgC, etc)do  not significantly interfere with this assay.   However, presence of multiple variants may affect accuracy.     11/17/2023 5.7 (H) 4.0 - 5.6 % Final     Comment:     ADA Screening  "Guidelines:  5.7-6.4%  Consistent with prediabetes  >or=6.5%  Consistent with diabetes    High levels of fetal hemoglobin interfere with the HbA1C  assay. Heterozygous hemoglobin variants (HbS, HgC, etc)do  not significantly interfere with this assay.   However, presence of multiple variants may affect accuracy.        Lab Results   Component Value Date    TSH 1.592 10/14/2024    TSH 1.716 11/17/2023    TSH 1.544 08/23/2021     No results found for: "FREET4"  Lab Results   Component Value Date    LDLCALC 100.8 10/14/2024    LDLCALC 92.8 06/11/2024    LDLCALC 99.4 11/17/2023     Lab Results   Component Value Date    TRIG 76 10/14/2024    TRIG 76 06/11/2024    TRIG 103 11/17/2023            Physical Exam      Constitutional:       Appearance: Normal appearance.   HENT:      Head: Normocephalic and atraumatic.   Eyes:      Extraocular Movements: Extraocular movements intact.      Pupils: Pupils are equal, round, and reactive to light.   Cardiovascular:      Rate and Rhythm: Normal rate.   Pulmonary:      Effort: Pulmonary effort is normal.   Neurological:      Mental Status: She is alert and oriented to person, place, and time.   Psychiatric:         Mood and Affect: Mood normal.     Medication List with Changes/Refills   New Medications    VALSARTAN (DIOVAN) 320 MG TABLET    Take 1 tablet (320 mg total) by mouth once daily.   Current Medications    ASPIRIN (ECOTRIN) 81 MG EC TABLET    Take 81 mg by mouth once daily.    ATORVASTATIN (LIPITOR) 80 MG TABLET    Take 80 mg by mouth. Take 1/2 tablet daily    BUPROPION (WELLBUTRIN SR) 150 MG TBSR 12 HR TABLET    Take 1 tablet (150 mg total) by mouth 2 (two) times daily.    DILTIAZEM (DILT-XR) 240 MG CDCR    Take 1 capsule (240 mg total) by mouth every morning.    DOXAZOSIN (CARDURA) 2 MG TABLET    Take 1 tablet (2 mg total) by mouth once daily. Blood pressure    ESOMEPRAZOLE (NEXIUM) 40 MG CAPSULE    Take 1 capsule (40 mg total) by mouth before breakfast.    LEVOCETIRIZINE " (XYZAL) 5 MG TABLET    Take 5 mg by mouth every evening.    LISINOPRIL (PRINIVIL,ZESTRIL) 40 MG TABLET    Take 1 tablet (40 mg total) by mouth every evening.    METFORMIN (GLUCOPHAGE) 500 MG TABLET    Take 1 tablet (500 mg total) by mouth 3 (three) times daily.    OXYBUTYNIN (DITROPAN-XL) 5 MG TR24    Take 1 tablet (5 mg total) by mouth once daily.   Changed and/or Refilled Medications    Modified Medication Previous Medication    SILDENAFIL (VIAGRA) 25 MG TABLET sildenafiL (VIAGRA) 25 MG tablet       Take 1-2 tablets (25-50 mg total) by mouth daily as needed for Erectile Dysfunction. (Dont take within 4 hrs of blood pressure medication)    Take 1-2 tablets (25-50 mg total) by mouth daily as needed for Erectile Dysfunction. (Dont take within 4 hrs of blood pressure medication)    ZOLPIDEM (AMBIEN) 10 MG TAB zolpidem (AMBIEN) 10 mg Tab       Take 1 tablet (10 mg total) by mouth every evening. # 1    Take 1 tablet (10 mg total) by mouth every evening. # 1   Discontinued Medications    ATORVASTATIN (LIPITOR) 80 MG TABLET    Take 1 tablet (80 mg total) by mouth once daily.

## 2024-10-23 VITALS
OXYGEN SATURATION: 98 % | WEIGHT: 183.19 LBS | TEMPERATURE: 98 F | RESPIRATION RATE: 16 BRPM | HEART RATE: 88 BPM | HEIGHT: 66 IN | SYSTOLIC BLOOD PRESSURE: 146 MMHG | DIASTOLIC BLOOD PRESSURE: 88 MMHG | BODY MASS INDEX: 29.44 KG/M2

## 2024-11-13 ENCOUNTER — CLINICAL SUPPORT (OUTPATIENT)
Dept: FAMILY MEDICINE | Facility: CLINIC | Age: 74
End: 2024-11-13
Payer: MEDICARE

## 2024-11-13 VITALS — DIASTOLIC BLOOD PRESSURE: 80 MMHG | SYSTOLIC BLOOD PRESSURE: 138 MMHG | HEART RATE: 91 BPM | OXYGEN SATURATION: 96 %

## 2024-11-13 DIAGNOSIS — I10 ESSENTIAL HYPERTENSION: Primary | ICD-10-CM

## 2024-11-13 PROCEDURE — 99212 OFFICE O/P EST SF 10 MIN: CPT | Mod: PBBFAC,PN

## 2024-11-13 PROCEDURE — 99999 PR PBB SHADOW E&M-EST. PATIENT-LVL II: CPT | Mod: PBBFAC,,,

## 2024-11-13 NOTE — PROGRESS NOTES
BP: (!) 160/100 , Pulse: 97    Blood pressure reading after 15 minutes was 138/80, Pulse 91.  Dr. Maddox notified.    Miki Faustin 73 y.o. male is here today for Blood Pressure check.   History of HTN yes.    Review of patient's allergies indicates:  No Known Allergies  Creatinine   Date Value Ref Range Status   10/14/2024 1.0 0.5 - 1.4 mg/dL Final     Sodium   Date Value Ref Range Status   10/14/2024 140 136 - 145 mmol/L Final     Potassium   Date Value Ref Range Status   10/14/2024 4.0 3.5 - 5.1 mmol/L Final   ]  Patient verifies taking blood pressure medications on a regular basis at the same time of the day.     Current Outpatient Medications:     aspirin (ECOTRIN) 81 MG EC tablet, Take 81 mg by mouth once daily., Disp: , Rfl:     atorvastatin (LIPITOR) 80 MG tablet, Take 80 mg by mouth. Take 1/2 tablet daily, Disp: , Rfl:     buPROPion (WELLBUTRIN SR) 150 MG TBSR 12 hr tablet, Take 1 tablet (150 mg total) by mouth 2 (two) times daily., Disp: 180 tablet, Rfl: 3    diltiaZEM (DILT-XR) 240 MG CDCR, Take 1 capsule (240 mg total) by mouth every morning., Disp: 90 capsule, Rfl: 2    doxazosin (CARDURA) 2 MG tablet, Take 1 tablet (2 mg total) by mouth once daily. Blood pressure, Disp: 90 tablet, Rfl: 2    esomeprazole (NEXIUM) 40 MG capsule, Take 1 capsule (40 mg total) by mouth before breakfast., Disp: 90 capsule, Rfl: 3    levocetirizine (XYZAL) 5 MG tablet, Take 5 mg by mouth every evening., Disp: , Rfl:     metFORMIN (GLUCOPHAGE) 500 MG tablet, Take 1 tablet (500 mg total) by mouth 3 (three) times daily., Disp: 270 tablet, Rfl: 1    oxybutynin (DITROPAN-XL) 5 MG TR24, Take 1 tablet (5 mg total) by mouth once daily., Disp: 90 tablet, Rfl: 3    sildenafiL (VIAGRA) 25 MG tablet, Take 1-2 tablets (25-50 mg total) by mouth daily as needed for Erectile Dysfunction. (Dont take within 4 hrs of blood pressure medication), Disp: 30 tablet, Rfl: 1    valsartan (DIOVAN) 320 MG tablet, Take 1 tablet (320 mg total) by  mouth once daily., Disp: 90 tablet, Rfl: 2    zolpidem (AMBIEN) 10 mg Tab, Take 1 tablet (10 mg total) by mouth every evening. # 1, Disp: 90 tablet, Rfl: 0  Does patient have record of home blood pressure readings no. Readings have been averaging 160/80.   Last dose of blood pressure medication was taken at this morning.  Patient is asymptomatic.   Complains of High blood pressure.

## 2024-11-14 ENCOUNTER — HOSPITAL ENCOUNTER (OUTPATIENT)
Dept: CARDIOLOGY | Facility: HOSPITAL | Age: 74
Discharge: HOME | End: 2024-11-14
Attending: INTERNAL MEDICINE
Payer: MEDICARE

## 2024-11-14 DIAGNOSIS — I1A.0 RESISTANT HYPERTENSION: ICD-10-CM

## 2024-11-14 LAB
ABDOMINAL AORTA PROX EDV: 12 CM/S
ABDOMINAL AORTA PROX PSV: 72 CM/S
LEFT RENAL DIST DIAS: 27 CM/S
LEFT RENAL DIST SYS: 101 CM/S
LEFT RENAL MID DIAS: 34 CM/S
LEFT RENAL MID SYS: 152 CM/S
LEFT RENAL ORIGIN DIAS: 24 CM/S
LEFT RENAL ORIGIN SYS: 106 CM/S
LEFT RENAL PROX DIAS: 29 CM/S
LEFT RENAL PROX RAR: 1.57
LEFT RENAL PROX SYS: 113 CM/S
LEFT RENAL ULTRASOUND ACCELERATION TIME MEASUREMENT 1: 54 MS
LEFT RENAL ULTRASOUND ACCELERATION TIME MEASUREMENT 2: 51 MS
LEFT RENAL ULTRASOUND ACCELERATION TIME MEASUREMENT 3: 54 MS
LEFT RENAL ULTRASOUND ACCELERATION TIME MEASUREMENT AVERAGE: 54 MS
LEFT RENAL ULTRASOUND KIDNEY SIZE MEASUREMENT 1: 13 CM
LEFT RENAL ULTRASOUND KIDNEY SIZE MEASUREMENT 2: 13.2 CM
LEFT RENAL ULTRASOUND KIDNEY SIZE MEASUREMENT 3: 13.1 CM
LEFT RENAL ULTRASOUND KIDNEY SIZE MEASUREMENT AVERAGE: 13.2 CM
LEFT RENAL ULTRASOUND RESISTIVE INDEX MEASUREMENT 1: 0.67
LEFT RENAL ULTRASOUND RESISTIVE INDEX MEASUREMENT 2: 0.71
LEFT RENAL ULTRASOUND RESISTIVE INDEX MEASUREMENT 3: 0.67
LEFT RENAL ULTRASOUND RESISTIVE INDEX MEASUREMENT AVERAGE: 0.71
OHS CV LEFT RENAL RAR: 2.11
OHS CV RIGHT RENAL RAR: 1.22
OHS CV US LEFT RENAL HIGHEST EDV: 34
OHS CV US LEFT RENAL HIGHEST PSV: 152
OHS CV US RIGHT RENAL HIGHEST EDV: 22
OHS CV US RIGHT RENAL HIGHEST PSV: 88
RIGHT RENAL DIST DIAS: 14 CM/S
RIGHT RENAL DIST SYS: 61 CM/S
RIGHT RENAL MID DIAS: 18 CM/S
RIGHT RENAL MID SYS: 87 CM/S
RIGHT RENAL ORIGIN DIAS: 12 CM/S
RIGHT RENAL ORIGIN SYS: 57 CM/S
RIGHT RENAL PROX DIAS: 22 CM/S
RIGHT RENAL PROX RAR: 1.22
RIGHT RENAL PROX SYS: 88 CM/S
RIGHT RENAL ULTRASOUND ACCELERATION TIME MEASUREMENT 1: 80 MS
RIGHT RENAL ULTRASOUND ACCELERATION TIME MEASUREMENT 2: 57 MS
RIGHT RENAL ULTRASOUND ACCELERATION TIME MEASUREMENT 3: 63 MS
RIGHT RENAL ULTRASOUND ACCELERATION TIME MEASUREMENT AVERAGE: 80 MS
RIGHT RENAL ULTRASOUND KIDNEY SIZE MEASUREMENT 1: 13.2 CM
RIGHT RENAL ULTRASOUND KIDNEY SIZE MEASUREMENT 2: 13 CM
RIGHT RENAL ULTRASOUND KIDNEY SIZE MEASUREMENT 3: 13.1 CM
RIGHT RENAL ULTRASOUND KIDNEY SIZE MEASUREMENT AVERAGE: 13.2 CM
RIGHT RENAL ULTRASOUND RESISTIVE INDEX MEASUREMENT 1: 0.72
RIGHT RENAL ULTRASOUND RESISTIVE INDEX MEASUREMENT 2: 0.63
RIGHT RENAL ULTRASOUND RESISTIVE INDEX MEASUREMENT 3: 0.69
RIGHT RENAL ULTRASOUND RESISTIVE INDEX MEASUREMENT AVERAGE: 0.72

## 2024-11-14 PROCEDURE — 93975 VASCULAR STUDY: CPT | Mod: 26,,, | Performed by: INTERNAL MEDICINE

## 2024-11-14 PROCEDURE — 93975 VASCULAR STUDY: CPT | Mod: PO

## 2024-11-20 DIAGNOSIS — E11.9 TYPE 2 DIABETES MELLITUS WITHOUT COMPLICATION: ICD-10-CM

## 2024-11-27 ENCOUNTER — PATIENT MESSAGE (OUTPATIENT)
Dept: FAMILY MEDICINE | Facility: CLINIC | Age: 74
End: 2024-11-27
Payer: MEDICARE

## 2024-12-17 ENCOUNTER — PATIENT MESSAGE (OUTPATIENT)
Dept: ADMINISTRATIVE | Facility: HOSPITAL | Age: 74
End: 2024-12-17
Payer: MEDICARE

## 2024-12-19 LAB
LEFT EYE DM RETINOPATHY: POSITIVE
RIGHT EYE DM RETINOPATHY: POSITIVE

## 2024-12-24 ENCOUNTER — PATIENT MESSAGE (OUTPATIENT)
Dept: FAMILY MEDICINE | Facility: CLINIC | Age: 74
End: 2024-12-24
Payer: MEDICARE

## 2025-01-02 ENCOUNTER — PATIENT OUTREACH (OUTPATIENT)
Dept: ADMINISTRATIVE | Facility: HOSPITAL | Age: 75
End: 2025-01-02
Payer: MEDICARE

## 2025-01-24 DIAGNOSIS — R73.02 GLUCOSE INTOLERANCE (IMPAIRED GLUCOSE TOLERANCE): ICD-10-CM

## 2025-01-24 RX ORDER — METFORMIN HYDROCHLORIDE 500 MG/1
500 TABLET ORAL 3 TIMES DAILY
Qty: 270 TABLET | Refills: 0 | Status: SHIPPED | OUTPATIENT
Start: 2025-01-24

## 2025-01-25 NOTE — TELEPHONE ENCOUNTER
Care Due:                  Date            Visit Type   Department     Provider  --------------------------------------------------------------------------------                                EP -                              PRIMARY      [facility] FAMILY  Last Visit: 10-      CARE ([facility])   JORDY Maddox                               -                              [facility]  Next Visit: 04-      CARE ([facility])   MEDICINE       Junior Maddox                                                            Last  Test          Frequency    Reason                     Performed    Due Date  --------------------------------------------------------------------------------    HBA1C.......  6 months...  metFORMIN................  10-   04-    Health [facility] Embedded Care Due Messages. Reference number: [id number].   1/24/2025 6:13:22 PM CST

## 2025-01-25 NOTE — TELEPHONE ENCOUNTER
Provider Staff:  Action required for this patient    Requires labs      Please see care gap opportunities below in Care Due Message.    Thanks!  [facility]     Appointments      Date Provider   Last Visit   10/21/2024 Junior Maddox MD   Next Visit   4/21/2025 Junior Maddox MD     Refill Decision Note   Miki Faustin  is requesting a refill authorization.  Brief Assessment and Rationale for Refill:  Approve     Medication Therapy Plan:         Comments:     Note composed:11:00 PM 01/24/2025

## 2025-01-29 ENCOUNTER — TELEPHONE (OUTPATIENT)
Dept: DERMATOLOGY | Facility: CLINIC | Age: 75
End: 2025-01-29
Payer: MEDICARE

## 2025-01-29 NOTE — TELEPHONE ENCOUNTER
----- Message from Tech Kathytony sent at 1/29/2025  4:40 PM CST -----  Contact: self    ----- Message -----  From: Joshua Broderick  Sent: 1/29/2025   4:39 PM CST  To: Sofiya Finney Staff    Type:  Patient Returning Call    Who Called:  PT  Who Left Message for Patient:  n/a  Does the patient know what this is regarding?:  yes  Best Call Back Number:  [phone]   Additional Information:

## 2025-01-30 DIAGNOSIS — C44.41 BASAL CELL CARCINOMA, SCALP/NECK: Primary | ICD-10-CM

## 2025-02-11 ENCOUNTER — PROCEDURE VISIT (OUTPATIENT)
Dept: DERMATOLOGY | Facility: CLINIC | Age: 75
End: 2025-02-11
Payer: MEDICARE

## 2025-02-11 VITALS
HEART RATE: 92 BPM | WEIGHT: 183.19 LBS | HEIGHT: 66 IN | DIASTOLIC BLOOD PRESSURE: 85 MMHG | SYSTOLIC BLOOD PRESSURE: 154 MMHG | BODY MASS INDEX: 29.44 KG/M2

## 2025-02-11 DIAGNOSIS — C44.41 BASAL CELL CARCINOMA, SCALP/NECK: ICD-10-CM

## 2025-02-11 PROCEDURE — 17311 MOHS 1 STAGE H/N/HF/G: CPT | Mod: S$PBB,,, | Performed by: DERMATOLOGY

## 2025-02-11 PROCEDURE — 13121 CMPLX RPR S/A/L 2.6-7.5 CM: CPT | Mod: PBBFAC,PN | Performed by: DERMATOLOGY

## 2025-02-11 PROCEDURE — 17311 MOHS 1 STAGE H/N/HF/G: CPT | Mod: PBBFAC,PN | Performed by: DERMATOLOGY

## 2025-02-11 PROCEDURE — 13121 CMPLX RPR S/A/L 2.6-7.5 CM: CPT | Mod: S$PBB,51,, | Performed by: DERMATOLOGY

## 2025-02-11 NOTE — PROGRESS NOTES
MOHS MICROGRAPHIC SURGERY OPERATIVE NOTE  Name:  Miki Faustin  Date: 2025  Patient : 1950  Attending Surgeon: Reg Arriaza MD  Assistants: Harini Lomas - Surgical Technician  Anesthetic Agent: 1% lidocaine with 1:100,000 epinephrine  Clinical Diagnosis: basal cell carcinoma - nodular  Operation: Mohs Micrographic Surgery  Location: right medial frontal scalp  Indications: Location in mask areas of face including central face, nose, eyelids, eyebrows, lips, chin, preauricular, temple, and ear.  Surgical Preparation: povidone-iodine  Pre-op anbitioics: no     Description of Operation:  The nature and purpose of the procedure, associated risks and alternative treatments were explained to the patient in detail. All patient questions were answered completely. An informed operative consent and photography permit were obtained. The tumor location was then identified and marked with agreement by the patient of the correct location. The patient was positioned, prepped, and draped in the usual sterile manner. Local anesthesia was obtained with 3 cc(s) of 1% lidocaine with 1:100,000 epinephrine. The lesion pre-operatively measures 0.9 by 0.8 cm.     1ST STAGE:  A 2+ mm rim of normal appearing skin was marked circumferentially around the lesion after scraping with a curette to define the margin. The area thus outlined was excised at a 45 degree angle. Hemostasis was obtained with electrodesiccation. The specimen was oriented, mapped, and subdivided into at least two sections. Sections were then chromacoded and submitted for horizontal frozen sections. The patient tolerated the procedure well and there were no complications. Upon microscopic examination of the processed horizontal frozen sections of this stage:  No residual tumor was noted.      SUMMARY:  The tumor was extirpated in 1 Mohs Stages resulting in a final defect measuring 1.4 by 1.3 cm².   The final defect extended deep to subcutaneous  fat  The patient tolerated the procedure well and no complications were noted.     DEFECT PHOTO:      Reg Arriaza MD\\    Complex Linear Closure Operative Note  Name: Miki Faustin  YOB: 1950  Date: 2/11/2025  Attending Surgeon: Reg Arriaza MD FAAD  Assistants:  Harini Lomas - Surgical Technician  Clinical Diagnosis: A 1.4 by 1.3 cm defect secondary to Mohs Micrographic Surgery  Location: right frontal scalp  Operation: Complex linear closure  Surgical Preparation: broad scrub with povidone-iodine    Description of Operation:  The nature and purpose of the procedure, associated risks and alternative treatments were explained to the patient in detail. All patient questions were answered completely. In order to minimize tension on the closure and avoid compromising the anatomic contour of the cosmetic region and maximize functional capacity, a complex linear closure was performed. An informed operative consent and photography permit were obtained. The patient was positioned, prepped, and draped in the usual sterile manner. Local anesthesia was obtained with 12 cc(s) of 1% lidocaine with 1:100,000 epinephrine.    The defect edges were debeveled with a scalpel blade. The circular defect was widely undermined in the deep subcutaneous plane at least 100% of the defect diameter to allow for maximum tissue movement. Hemostasis was achieved with spot electrodessication. The defect was closed first utilizing multiple 3-0 Vicryl interrupted buried subcutaneous sutures. This resulted in excellent apposition of the subcutis and dermis, however two redundant areas of tissue were created on opposite sides of the defect. Utilizing a #15 scalpel blade, two Burows triangles were excised to ensure a flat scar. Hemostasis was obtained with spot electrodessication. The epidermal edges throughout further fastened superficially with 4-0 Plain Gut. The final length of the repair was 4.4 cm. The patient  tolerated the procedure well and there were no complications.    Polysporin, non-adherent gauze and a pressure dressing were applied to wound after gentle cleansing with saline.    Patient instructed in and provided with instructions for post-op care, will RTC in ~10 days for suture removal    Post op meds: None    Photos:      MD ROSINA Saldivar

## 2025-02-11 NOTE — LETTER
February 11, 2025    Rosario Thompson MD  [address] [facility]  [facility] [address]         [facility] D[facility]at[facility] Surgery  [address] [facility] [address]  Phone: [phone]  Fax: [phone]   Patient: Miki Faustin   MR Number: [id number]   YOB: 1950   Date of Visit: 2/11/2025     Dear Dr. Thompson,     Miki Faustin was seen at our office today for Mohs Micrographic surgery of the basal cell carcinoma - nodular on the right medial frontal scalp. The tumor was extirpated in 1 stage leaving a final defect of 1.4 x 1.3 cm. After discussion of the possible repair options and in order to achieve an excellent cosmetic and functional result, the wound was repaired with a complex linear closure.                                     The patient elected for dissolvable sutures and will be seen in our clinic PRN. They will then be returned fully to your care pending any further need for our services. Thank you for allowing us to participate in the care of this patient.      Reg Arriaza MD FAAD

## 2025-02-16 DIAGNOSIS — I10 ESSENTIAL HYPERTENSION: ICD-10-CM

## 2025-02-16 NOTE — TELEPHONE ENCOUNTER
No care due was identified.  Health [facility] Embedded Care Due Messages. Reference number: [id number].   2/16/2025 11:53:26 AM CST

## 2025-02-17 RX ORDER — DOXAZOSIN 2 MG/1
2 TABLET ORAL
Qty: 90 TABLET | Refills: 2 | Status: SHIPPED | OUTPATIENT
Start: 2025-02-17

## 2025-02-17 NOTE — TELEPHONE ENCOUNTER
Refill Decision Note   Miki Faustin  is requesting a refill authorization.  Brief Assessment and Rationale for Refill:  Approve     Medication Therapy Plan:         Comments:     Note composed:2:09 AM 02/17/2025

## 2025-02-18 ENCOUNTER — OFFICE VISIT (OUTPATIENT)
Dept: DERMATOLOGY | Facility: CLINIC | Age: 75
End: 2025-02-18
Payer: MEDICARE

## 2025-02-18 DIAGNOSIS — Z48.02 VISIT FOR SUTURE REMOVAL: Primary | ICD-10-CM

## 2025-02-18 NOTE — PROGRESS NOTES
Dermatology Outpatient Clinic Progress Note    Patient Name: Miki Faustin  Patient : 1950  MRN: [id number]  Date of Service: 2025    CC/HPI: iMki Faustin is a 74 y.o. year old male with history of actinic keratosis and basal cell carcinoma who presents today for wound check s/p mohs 1 week post op scalp/forehead plain gut used.  Patient reports no issues    ROS:   Dermatological ROS: positive for skin lesion changes    Physical Exam   Head   Head comments: Sutures c/d/I and ready for removal         Diagram Legend     Erythematous scaling macule/papule c/w actinic keratosis       Vascular papule c/w angioma      Pigmented verrucoid papule/plaque c/w seborrheic keratosis      Yellow umbilicated papule c/w sebaceous hyperplasia      Irregularly shaped tan macule c/w lentigo     1-2 mm smooth white papules consistent with Milia      Movable subcutaneous cyst with punctum c/w epidermal inclusion cyst      Subcutaneous movable cyst c/w pilar cyst      Firm pink to brown papule c/w dermatofibroma      Pedunculated fleshy papule(s) c/w skin tag(s)      Evenly pigmented macule c/w junctional nevus     Mildly variegated pigmented, slightly irregular-bordered macule c/w mildly atypical nevus      Flesh colored to evenly pigmented papule c/w intradermal nevus       Pink pearly papule/plaque c/w basal cell carcinoma      Erythematous hyperkeratotic cursted plaque c/w SCC      Surgical scar with no sign of skin cancer recurrence      Open and closed comedones      Inflammatory papules and pustules      Verrucoid papule consistent consistent with wart     Erythematous eczematous patches and plaques     Dystrophic onycholytic nail with subungual debris c/w onychomycosis     Umbilicated papule    Erythematous-base heme-crusted tan verrucoid plaque consistent with inflamed seborrheic keratosis     Erythematous Silvery Scaling Plaque c/w Psoriasis     See annotation    Assessment/Plan:    There are no diagnoses linked to  this encounter.    - sutures clean, dry, intact, ok'd to remove at this visit  Mohs Surgery Suture Removal Note   Patient Name: Miki Faustin  Patient : 1950  Date of Service: 2025    CC: Pt. Presents for removal of sutures on the scalp/forehead today  Subjective: patient reports wound healing as expected     Objective: Wound well healed, sutures clean/dry/intact. No evidence of any complications noted.     Visit For Suture Removal:  - Suture removal today  - Steri strips/mastisol were not applied  - Patient counseled on silicone gel/silicone sheeting and their use in the management of post-operative scars  - Handout on silicone gel/silicone gel sheeting was provided  - Patient to follow up with their referring provider in 3 months for further skin evaluation    Harini Lomas     Follow up in [facility] ([place], [place])        MD ROSINA Saldivar

## 2025-02-26 ENCOUNTER — PATIENT MESSAGE (OUTPATIENT)
Dept: FAMILY MEDICINE | Facility: CLINIC | Age: 75
End: 2025-02-26
Payer: MEDICARE

## 2025-04-14 ENCOUNTER — PATIENT MESSAGE (OUTPATIENT)
Dept: FAMILY MEDICINE | Facility: CLINIC | Age: 75
End: 2025-04-14
Payer: MEDICARE

## 2025-04-21 ENCOUNTER — OFFICE VISIT (OUTPATIENT)
Dept: FAMILY MEDICINE | Facility: CLINIC | Age: 75
End: 2025-04-21
Payer: MEDICARE

## 2025-04-21 VITALS
DIASTOLIC BLOOD PRESSURE: 86 MMHG | HEIGHT: 66 IN | HEART RATE: 83 BPM | RESPIRATION RATE: 16 BRPM | SYSTOLIC BLOOD PRESSURE: 146 MMHG | TEMPERATURE: 98 F | OXYGEN SATURATION: 96 % | WEIGHT: 189.81 LBS | BODY MASS INDEX: 30.51 KG/M2

## 2025-04-21 DIAGNOSIS — E78.2 DM TYPE 2 WITH DIABETIC MIXED HYPERLIPIDEMIA: Primary | ICD-10-CM

## 2025-04-21 DIAGNOSIS — Z79.899 HIGH RISK MEDICATIONS (NOT ANTICOAGULANTS) LONG-TERM USE: ICD-10-CM

## 2025-04-21 DIAGNOSIS — I70.0 AORTIC ATHEROSCLEROSIS: ICD-10-CM

## 2025-04-21 DIAGNOSIS — E11.69 DM TYPE 2 WITH DIABETIC MIXED HYPERLIPIDEMIA: Primary | ICD-10-CM

## 2025-04-21 DIAGNOSIS — I10 ESSENTIAL HYPERTENSION: ICD-10-CM

## 2025-04-21 DIAGNOSIS — R73.02 GLUCOSE INTOLERANCE (IMPAIRED GLUCOSE TOLERANCE): ICD-10-CM

## 2025-04-21 DIAGNOSIS — I1A.0 RESISTANT HYPERTENSION: ICD-10-CM

## 2025-04-21 DIAGNOSIS — G47.00 INSOMNIA, UNSPECIFIED TYPE: ICD-10-CM

## 2025-04-21 DIAGNOSIS — Z00.00 MEDICARE ANNUAL WELLNESS VISIT, SUBSEQUENT: ICD-10-CM

## 2025-04-21 DIAGNOSIS — C44.41 BASAL CELL CARCINOMA (BCC) OF SCALP: ICD-10-CM

## 2025-04-21 PROCEDURE — 99999 PR PBB SHADOW E&M-EST. PATIENT-LVL III: CPT | Mod: PBBFAC,,, | Performed by: INTERNAL MEDICINE

## 2025-04-21 PROCEDURE — 99213 OFFICE O/P EST LOW 20 MIN: CPT | Mod: PBBFAC,PN | Performed by: INTERNAL MEDICINE

## 2025-04-21 RX ORDER — ATORVASTATIN CALCIUM 80 MG/1
80 TABLET, FILM COATED ORAL DAILY
Qty: 90 TABLET | Refills: 2 | Status: SHIPPED | OUTPATIENT
Start: 2025-04-21

## 2025-04-21 RX ORDER — VALSARTAN 320 MG/1
320 TABLET ORAL DAILY
Qty: 90 TABLET | Refills: 2 | Status: SHIPPED | OUTPATIENT
Start: 2025-04-21 | End: 2026-04-21

## 2025-04-21 RX ORDER — METFORMIN HYDROCHLORIDE 500 MG/1
500 TABLET ORAL 3 TIMES DAILY
Qty: 270 TABLET | Refills: 2 | Status: SHIPPED | OUTPATIENT
Start: 2025-04-21

## 2025-04-21 RX ORDER — ZOLPIDEM TARTRATE 10 MG/1
10 TABLET ORAL NIGHTLY
Qty: 90 TABLET | Refills: 0 | Status: SHIPPED | OUTPATIENT
Start: 2025-04-21

## 2025-04-21 RX ORDER — DOXAZOSIN 2 MG/1
2 TABLET ORAL EVERY 12 HOURS
Qty: 180 TABLET | Refills: 2 | Status: SHIPPED | OUTPATIENT
Start: 2025-04-21

## 2025-04-21 NOTE — PROGRESS NOTES
Subjective:       Patient ID: Miki Faustin is a 74 y.o. male.    Chief Complaint: Follow-up   HPI     History of Present Illness              PSA: 1.5 ((10/24  ) urology   Colonoscopy:  6/2/14 r/c 5   Immunizations: Tdap: 2017 Prevnar 13: 2017  Shingles: Y  Smoker: never   Eye: Dr Gifford   Derm: K Jay + sq cell ca face, Basal Ca MOHS scalp/L neck      F/u   Labs due         HTN: still elevated  Rx Diltiazem 240, Cardura 2 increase bid, Valsartan 320               Off Hctz due OAB. off Lisinopril 40.      Cardic mumur:  21' Echo + Mod AV sclerosis, Mild MR sclerosis, mild TR  2021 cardio Dr Rivera past      Type 2 DM:  controlled A1c 5.9 G 109  // Rx Metformin  3 x daily.      highest fasting 143     Insomnia: controlled Rx Ambien 5-10 mg nightly      Mixed HLD:  LDL goal< 70//  Rx Lipitor 80 mg 1/2 pill      Atherosclerosis: + Aorta scattered calcific plaque, coronary artery calcifications.     Anxiety: controlled Rx Wellbutrin  bid       BPH/overactive bladder:  seen urology started Rx Oxybutynin helpful. + dry mouth.   Hx urgency, incontinence at times, .      Pulmonary nodules/mild  Bronchiectasis BLL:  Stable CT.  Recommended no further exam needed.    mgmt Pulm  Positive pulmonary nodules < 4 mm bilateral. CT 2021.      Chronic Allergies: mgmt Dr Pacheco  Rx Xyzal, 3 allergies shots weekly   Off Singular.   Chronic cough: mgmt  ENT     GERD HH: controlled Rx Nexium 40      ED:  Rx Viagra 25      ____________________________________________________________________________________________________  Assessment & Plan:  1. DM type 2 with diabetic mixed hyperlipidemia  - Comprehensive Metabolic Panel; Future  - Hemoglobin A1C; Future  - Lipid Panel; Future  - Microalbumin/Creatinine Ratio, Urine; Future  - Urinalysis, Reflex to Urine Culture; Future  - metFORMIN (GLUCOPHAGE) 500 MG tablet; Take 1 tablet (500 mg total) by mouth 3 (three) times daily.  Dispense: 270 tablet; Refill: 2  -  atorvastatin (LIPITOR) 80 MG tablet; Take 1 tablet (80 mg total) by mouth once daily.  Dispense: 90 tablet; Refill: 2    2. Basal cell carcinoma (BCC) of scalp    3. Essential hypertension    4. High risk medications (not anticoagulants) long-term use  - CBC Without Differential; Future  - Vitamin B12; Future    5. Aortic atherosclerosis  - atorvastatin (LIPITOR) 80 MG tablet; Take 1 tablet (80 mg total) by mouth once daily.  Dispense: 90 tablet; Refill: 2    6. Medicare annual wellness visit, subsequent  - CBC Without Differential; Future  - Comprehensive Metabolic Panel; Future  - Hemoglobin A1C; Future  - Lipid Panel; Future  - Microalbumin/Creatinine Ratio, Urine; Future  - Vitamin B12; Future  - Urinalysis, Reflex to Urine Culture; Future    7. Glucose intolerance (impaired glucose tolerance)    8. Resistant hypertension  - valsartan (DIOVAN) 320 MG tablet; Take 1 tablet (320 mg total) by mouth once daily.  Dispense: 90 tablet; Refill: 2  - doxazosin (CARDURA) 2 MG tablet; Take 1 tablet (2 mg total) by mouth every 12 (twelve) hours.  Dispense: 180 tablet; Refill: 2    9. Insomnia, unspecified type  - zolpidem (AMBIEN) 10 mg Tab; Take 1 tablet (10 mg total) by mouth every evening. # 1  Dispense: 90 tablet; Refill: 0    10. Essential hypertension     DM type 2 with diabetic mixed hyperlipidemia  -     Comprehensive Metabolic Panel; Future; Expected date: 04/21/2025  -     Hemoglobin A1C; Future; Expected date: 04/21/2025  -     Lipid Panel; Future; Expected date: 04/21/2025  -     Microalbumin/Creatinine Ratio, Urine; Future; Expected date: 04/21/2025  -     Urinalysis, Reflex to Urine Culture; Future; Expected date: 04/21/2025  -     metFORMIN (GLUCOPHAGE) 500 MG tablet; Take 1 tablet (500 mg total) by mouth 3 (three) times daily.  Dispense: 270 tablet; Refill: 2  -     atorvastatin (LIPITOR) 80 MG tablet; Take 1 tablet (80 mg total) by mouth once daily.  Dispense: 90 tablet; Refill: 2    Basal cell carcinoma  (BCC) of scalp  Comments:  scalp and left neck    Essential hypertension    High risk medications (not anticoagulants) long-term use  -     CBC Without Differential; Future; Expected date: 04/21/2025  -     Vitamin B12; Future; Expected date: 04/21/2025    Aortic atherosclerosis  -     atorvastatin (LIPITOR) 80 MG tablet; Take 1 tablet (80 mg total) by mouth once daily.  Dispense: 90 tablet; Refill: 2    Medicare annual wellness visit, subsequent  -     CBC Without Differential; Future; Expected date: 04/21/2025  -     Comprehensive Metabolic Panel; Future; Expected date: 04/21/2025  -     Hemoglobin A1C; Future; Expected date: 04/21/2025  -     Lipid Panel; Future; Expected date: 04/21/2025  -     Microalbumin/Creatinine Ratio, Urine; Future; Expected date: 04/21/2025  -     Vitamin B12; Future; Expected date: 04/21/2025  -     Urinalysis, Reflex to Urine Culture; Future; Expected date: 04/21/2025    Glucose intolerance (impaired glucose tolerance)    Resistant hypertension  Comments:  get Us  Orders:  -     valsartan (DIOVAN) 320 MG tablet; Take 1 tablet (320 mg total) by mouth once daily.  Dispense: 90 tablet; Refill: 2  -     doxazosin (CARDURA) 2 MG tablet; Take 1 tablet (2 mg total) by mouth every 12 (twelve) hours.  Dispense: 180 tablet; Refill: 2    Insomnia, unspecified type  -     zolpidem (AMBIEN) 10 mg Tab; Take 1 tablet (10 mg total) by mouth every evening. # 1  Dispense: 90 tablet; Refill: 0    Essential hypertension  Comments:  Return for blood pressure check, digital medicine        Continue to work on maintain healthy weight, balanced diet. Avoid unhealthy habits: smoking/vaping, excessive alcohol intake.     Recommend diet exercise:  High protein, low fat, low carb diet - calories women under <1200 & men <1800, carbs <100 G, protein 50-60 G daily. Protein supplements to replace one meal.  Keep all beverages < 10 calories per serving. Keep snacks < 100 calories.   Recommend exercise 160 minutes per  week, combo of cardio and weight/strength training.     Visit today included increased complexity associated with the care of the episodic problem addressed and managing the longitudinal care of the patient due to the serious and/or complex managed problem(s). HTN      Disclaimer: This note was partly generated using dictation software which may occasionally result in transcription errors  ____________________________________________________________________________________________________  Review of Systems:  Review of Systems      Negative     Objective:     Wt Readings from Last 3 Encounters:   04/21/25 86.1 kg (189 lb 13.1 oz)   02/11/25 83.1 kg (183 lb 3.2 oz)   10/21/24 83.1 kg (183 lb 3.2 oz)     BP Readings from Last 3 Encounters:   04/21/25 (!) 146/86   02/11/25 (!) 154/85   11/13/24 138/80       Lab Results   Component Value Date    WBC 7.91 10/14/2024    HGB 14.8 10/14/2024    HCT 44.4 10/14/2024     10/14/2024     10/14/2024    K 4.0 10/14/2024     10/14/2024    ALT 16 10/14/2024    AST 19 10/14/2024    CO2 20 (L) 10/14/2024    CREATININE 1.0 10/14/2024    BUN 23 10/14/2024    PSA 1.6 11/17/2023     10/14/2024      Hemoglobin A1C   Date Value Ref Range Status   10/14/2024 5.9 (H) 4.0 - 5.6 % Final     Comment:     ADA Screening Guidelines:  5.7-6.4%  Consistent with prediabetes  >or=6.5%  Consistent with diabetes    High levels of fetal hemoglobin interfere with the HbA1C  assay. Heterozygous hemoglobin variants (HbS, HgC, etc)do  not significantly interfere with this assay.   However, presence of multiple variants may affect accuracy.     06/11/2024 5.8 (H) 4.0 - 5.6 % Final     Comment:     ADA Screening Guidelines:  5.7-6.4%  Consistent with prediabetes  >or=6.5%  Consistent with diabetes    High levels of fetal hemoglobin interfere with the HbA1C  assay. Heterozygous hemoglobin variants (HbS, HgC, etc)do  not significantly interfere with this assay.   However, presence of  "multiple variants may affect accuracy.     11/17/2023 5.7 (H) 4.0 - 5.6 % Final     Comment:     ADA Screening Guidelines:  5.7-6.4%  Consistent with prediabetes  >or=6.5%  Consistent with diabetes    High levels of fetal hemoglobin interfere with the HbA1C  assay. Heterozygous hemoglobin variants (HbS, HgC, etc)do  not significantly interfere with this assay.   However, presence of multiple variants may affect accuracy.        Lab Results   Component Value Date    TSH 1.592 10/14/2024    TSH 1.716 11/17/2023    TSH 1.544 08/23/2021     No results found for: "FREET4"  Lab Results   Component Value Date    LDLCALC 100.8 10/14/2024    LDLCALC 92.8 06/11/2024    LDLCALC 99.4 11/17/2023     Lab Results   Component Value Date    TRIG 76 10/14/2024    TRIG 76 06/11/2024    TRIG 103 11/17/2023            Physical Exam      Constitutional:       Appearance: Normal appearance.   HENT:      Head: Normocephalic and atraumatic.   Eyes:      Extraocular Movements: Extraocular movements intact.      Pupils: Pupils are equal, round, and reactive to light.   Cardiovascular:      Rate and Rhythm: Normal rate, 3/6 KAITY, no swelling   Pulmonary:      Effort: Pulmonary effort is normal.   Neurological:      Mental Status: She is alert and oriented to person, place, and time.   Psychiatric:         Mood and Affect: Mood normal.     Medication List with Changes/Refills   Current Medications    ASPIRIN (ECOTRIN) 81 MG EC TABLET    Take 81 mg by mouth once daily.    BUPROPION (WELLBUTRIN SR) 150 MG TBSR 12 HR TABLET    Take 1 tablet (150 mg total) by mouth 2 (two) times daily.    DILTIAZEM (DILT-XR) 240 MG CDCR    Take 1 capsule (240 mg total) by mouth every morning.    LEVOCETIRIZINE (XYZAL) 5 MG TABLET    Take 5 mg by mouth every evening.    OXYBUTYNIN (DITROPAN-XL) 5 MG TR24    Take 1 tablet (5 mg total) by mouth once daily.    SILDENAFIL (VIAGRA) 25 MG TABLET    Take 1-2 tablets (25-50 mg total) by mouth daily as needed for Erectile " Dysfunction. (Dont take within 4 hrs of blood pressure medication)   Changed and/or Refilled Medications    Modified Medication Previous Medication    ATORVASTATIN (LIPITOR) 80 MG TABLET atorvastatin (LIPITOR) 80 MG tablet       Take 1 tablet (80 mg total) by mouth once daily.    Take 80 mg by mouth. Take 1/2 tablet daily    DOXAZOSIN (CARDURA) 2 MG TABLET doxazosin (CARDURA) 2 MG tablet       Take 1 tablet (2 mg total) by mouth every 12 (twelve) hours.    Take 1 tablet by mouth once daily for blood pressure    METFORMIN (GLUCOPHAGE) 500 MG TABLET metFORMIN (GLUCOPHAGE) 500 MG tablet       Take 1 tablet (500 mg total) by mouth 3 (three) times daily.    TAKE 1 TABLET BY MOUTH THREE TIMES DAILY    VALSARTAN (DIOVAN) 320 MG TABLET valsartan (DIOVAN) 320 MG tablet       Take 1 tablet (320 mg total) by mouth once daily.    Take 1 tablet (320 mg total) by mouth once daily.    ZOLPIDEM (AMBIEN) 10 MG TAB zolpidem (AMBIEN) 10 mg Tab       Take 1 tablet (10 mg total) by mouth every evening. # 1    Take 1 tablet (10 mg total) by mouth every evening. # 1   Discontinued Medications    ESOMEPRAZOLE (NEXIUM) 40 MG CAPSULE    Take 1 capsule (40 mg total) by mouth before breakfast.

## 2025-04-25 DIAGNOSIS — E78.2 DM TYPE 2 WITH DIABETIC MIXED HYPERLIPIDEMIA: ICD-10-CM

## 2025-04-25 DIAGNOSIS — I1A.0 RESISTANT HYPERTENSION: ICD-10-CM

## 2025-04-25 DIAGNOSIS — E11.69 DM TYPE 2 WITH DIABETIC MIXED HYPERLIPIDEMIA: ICD-10-CM

## 2025-04-25 RX ORDER — VALSARTAN 320 MG/1
320 TABLET ORAL
Qty: 90 TABLET | Refills: 0 | OUTPATIENT
Start: 2025-04-25

## 2025-04-25 RX ORDER — METFORMIN HYDROCHLORIDE 500 MG/1
500 TABLET ORAL 3 TIMES DAILY
Qty: 270 TABLET | Refills: 0 | OUTPATIENT
Start: 2025-04-25

## 2025-04-25 NOTE — TELEPHONE ENCOUNTER
Provider Staff:  Action required for this patient    Requires labs      Please see care gap opportunities below in Care Due Message.    Thanks!  [facility]     Appointments      Date Provider   Last Visit   4/21/2025 Junior Maddox MD   Next Visit   Visit date not found Junior Maddox MD     Refill Decision Note   Miki Faustin  is requesting a refill authorization.    Brief Assessment and Rationale for Refill:   Quick Discontinue       Medication Therapy Plan:   E-Prescribing Status: Receipt confirmed by pharmacy (4/21/2025  2:40 PM CDT)      Comments:     Note composed:12:45 PM 04/25/2025

## 2025-04-25 NOTE — TELEPHONE ENCOUNTER
Care Due:                  Date            Visit Type   Department     Provider  --------------------------------------------------------------------------------                                EP -                              PRIMARY      [facility]  Last Visit: 04-      CARE ([facility])   MEDICINE       Junior Maddox                               -                              [facility]  Next Visit: 10-      CARE ([facility])   MEDICINE       Junior Maddox                                                            Last  Test          Frequency    Reason                     Performed    Due Date  --------------------------------------------------------------------------------    HBA1C.......  6 months...  metFORMIN................  10-   04-    [facility] Embedded Care Due Messages. Reference number: [id number].   4/25/2025 9:27:46 AM CDT

## 2025-04-28 DIAGNOSIS — Z00.00 ENCOUNTER FOR MEDICARE ANNUAL WELLNESS EXAM: ICD-10-CM

## 2025-06-04 ENCOUNTER — PATIENT MESSAGE (OUTPATIENT)
Dept: FAMILY MEDICINE | Facility: CLINIC | Age: 75
End: 2025-06-04
Payer: MEDICARE

## 2025-06-12 ENCOUNTER — PATIENT OUTREACH (OUTPATIENT)
Dept: ADMINISTRATIVE | Facility: HOSPITAL | Age: 75
End: 2025-06-12
Payer: MEDICARE

## 2025-06-12 NOTE — PROGRESS NOTES
Pt declines all testings and appts at this time due to wife's medical health  He does request deferring this at this time

## 2025-06-23 DIAGNOSIS — F41.9 ANXIETY: ICD-10-CM

## 2025-06-23 RX ORDER — BUPROPION HYDROCHLORIDE 150 MG/1
150 TABLET, EXTENDED RELEASE ORAL 2 TIMES DAILY
Qty: 180 TABLET | Refills: 3 | Status: SHIPPED | OUTPATIENT
Start: 2025-06-23

## 2025-06-23 NOTE — TELEPHONE ENCOUNTER
No care due was identified.  [facility] Embedded Care Due Messages. Reference number: [id number].   6/23/2025 9:06:09 AM CDT

## 2025-06-30 ENCOUNTER — LAB VISIT (OUTPATIENT)
Dept: LAB | Facility: HOSPITAL | Age: 75
End: 2025-06-30
Attending: INTERNAL MEDICINE
Payer: MEDICARE

## 2025-06-30 DIAGNOSIS — Z00.00 MEDICARE ANNUAL WELLNESS VISIT, SUBSEQUENT: ICD-10-CM

## 2025-06-30 DIAGNOSIS — E78.2 DM TYPE 2 WITH DIABETIC MIXED HYPERLIPIDEMIA: ICD-10-CM

## 2025-06-30 DIAGNOSIS — E11.69 DM TYPE 2 WITH DIABETIC MIXED HYPERLIPIDEMIA: ICD-10-CM

## 2025-06-30 DIAGNOSIS — Z79.899 HIGH RISK MEDICATIONS (NOT ANTICOAGULANTS) LONG-TERM USE: ICD-10-CM

## 2025-06-30 LAB
ALBUMIN SERPL BCP-MCNC: 4.4 G/DL (ref 3.5–5.2)
ALP SERPL-CCNC: 71 UNIT/L (ref 40–150)
ALT SERPL W/O P-5'-P-CCNC: 16 UNIT/L (ref 10–44)
ANION GAP (OHS): 11 MMOL/L (ref 8–16)
AST SERPL-CCNC: 17 UNIT/L (ref 11–45)
BILIRUB SERPL-MCNC: 0.4 MG/DL (ref 0.1–1)
BUN SERPL-MCNC: 20 MG/DL (ref 8–23)
CALCIUM SERPL-MCNC: 9.6 MG/DL (ref 8.7–10.5)
CHLORIDE SERPL-SCNC: 108 MMOL/L (ref 95–110)
CHOLEST SERPL-MCNC: 186 MG/DL (ref 120–199)
CHOLEST/HDLC SERPL: 4.7 {RATIO} (ref 2–5)
CO2 SERPL-SCNC: 23 MMOL/L (ref 23–29)
CREAT SERPL-MCNC: 1.1 MG/DL (ref 0.5–1.4)
EAG (OHS): 126 MG/DL (ref 68–131)
ERYTHROCYTE [DISTWIDTH] IN BLOOD BY AUTOMATED COUNT: 14.4 % (ref 11.5–14.5)
GFR SERPLBLD CREATININE-BSD FMLA CKD-EPI: >60 ML/MIN/1.73/M2
GLUCOSE SERPL-MCNC: 115 MG/DL (ref 70–110)
HBA1C MFR BLD: 6 % (ref 4–5.6)
HCT VFR BLD AUTO: 47 % (ref 40–54)
HDLC SERPL-MCNC: 40 MG/DL (ref 40–75)
HDLC SERPL: 21.5 % (ref 20–50)
HGB BLD-MCNC: 15.2 GM/DL (ref 14–18)
LDLC SERPL CALC-MCNC: 129.6 MG/DL (ref 63–159)
MCH RBC QN AUTO: 27 PG (ref 27–31)
MCHC RBC AUTO-ENTMCNC: 32.3 G/DL (ref 32–36)
MCV RBC AUTO: 84 FL (ref 82–98)
NONHDLC SERPL-MCNC: 146 MG/DL
PLATELET # BLD AUTO: 330 K/UL (ref 150–450)
PMV BLD AUTO: 9.8 FL (ref 9.2–12.9)
POTASSIUM SERPL-SCNC: 4.3 MMOL/L (ref 3.5–5.1)
PROT SERPL-MCNC: 7.3 GM/DL (ref 6–8.4)
RBC # BLD AUTO: 5.63 M/UL (ref 4.6–6.2)
SODIUM SERPL-SCNC: 142 MMOL/L (ref 136–145)
TRIGL SERPL-MCNC: 82 MG/DL (ref 30–150)
VIT B12 SERPL-MCNC: 260 PG/ML (ref 210–950)
WBC # BLD AUTO: 5.59 K/UL (ref 3.9–12.7)

## 2025-06-30 PROCEDURE — 82607 VITAMIN B-12: CPT

## 2025-06-30 PROCEDURE — 80061 LIPID PANEL: CPT

## 2025-06-30 PROCEDURE — 83036 HEMOGLOBIN GLYCOSYLATED A1C: CPT

## 2025-06-30 PROCEDURE — 80053 COMPREHEN METABOLIC PANEL: CPT

## 2025-06-30 PROCEDURE — 85027 COMPLETE CBC AUTOMATED: CPT

## 2025-06-30 PROCEDURE — 36415 COLL VENOUS BLD VENIPUNCTURE: CPT | Mod: PN

## 2025-07-03 ENCOUNTER — RESULTS FOLLOW-UP (OUTPATIENT)
Dept: FAMILY MEDICINE | Facility: CLINIC | Age: 75
End: 2025-07-03
Payer: MEDICARE

## 2025-07-03 DIAGNOSIS — G47.00 INSOMNIA, UNSPECIFIED TYPE: Primary | ICD-10-CM

## 2025-07-03 RX ORDER — TRAZODONE HYDROCHLORIDE 50 MG/1
50 TABLET ORAL NIGHTLY
Qty: 90 TABLET | Refills: 1 | Status: SHIPPED | OUTPATIENT
Start: 2025-07-03 | End: 2026-07-03

## 2025-08-20 DIAGNOSIS — K21.9 LARYNGOPHARYNGEAL REFLUX (LPR): ICD-10-CM

## 2025-08-20 DIAGNOSIS — I10 ESSENTIAL HYPERTENSION: ICD-10-CM

## 2025-08-20 RX ORDER — DILTIAZEM HYDROCHLORIDE 240 MG/1
240 CAPSULE, EXTENDED RELEASE ORAL EVERY MORNING
Qty: 90 CAPSULE | Refills: 2 | Status: SHIPPED | OUTPATIENT
Start: 2025-08-20

## 2025-08-20 RX ORDER — ESOMEPRAZOLE MAGNESIUM 40 MG/1
40 CAPSULE, DELAYED RELEASE ORAL
Qty: 90 CAPSULE | Refills: 0 | OUTPATIENT
Start: 2025-08-20